# Patient Record
Sex: FEMALE | Employment: FULL TIME | URBAN - METROPOLITAN AREA
[De-identification: names, ages, dates, MRNs, and addresses within clinical notes are randomized per-mention and may not be internally consistent; named-entity substitution may affect disease eponyms.]

---

## 2023-11-27 ENCOUNTER — OCCMED (OUTPATIENT)
Dept: URGENT CARE | Facility: CLINIC | Age: 48
End: 2023-11-27

## 2023-11-27 ENCOUNTER — APPOINTMENT (OUTPATIENT)
Dept: LAB | Facility: CLINIC | Age: 48
End: 2023-11-27

## 2023-11-27 DIAGNOSIS — Z02.1 PHYSICAL EXAM, PRE-EMPLOYMENT: ICD-10-CM

## 2023-11-27 DIAGNOSIS — Z02.1 PHYSICAL EXAM, PRE-EMPLOYMENT: Primary | ICD-10-CM

## 2023-11-27 LAB
MEV IGG SER QL IA: ABNORMAL
MUV IGG SER QL IA: NORMAL
RUBV IGG SERPL IA-ACNC: 29.1 IU/ML
VZV IGG SER QL IA: NORMAL

## 2023-11-27 PROCEDURE — 86480 TB TEST CELL IMMUN MEASURE: CPT

## 2023-11-27 PROCEDURE — 86787 VARICELLA-ZOSTER ANTIBODY: CPT

## 2023-11-27 PROCEDURE — 86762 RUBELLA ANTIBODY: CPT

## 2023-11-27 PROCEDURE — 86735 MUMPS ANTIBODY: CPT

## 2023-11-27 PROCEDURE — 36415 COLL VENOUS BLD VENIPUNCTURE: CPT

## 2023-11-27 PROCEDURE — 86765 RUBEOLA ANTIBODY: CPT

## 2023-11-28 LAB
GAMMA INTERFERON BACKGROUND BLD IA-ACNC: 0.02 IU/ML
M TB IFN-G BLD-IMP: NEGATIVE
M TB IFN-G CD4+ BCKGRND COR BLD-ACNC: 0.14 IU/ML
M TB IFN-G CD4+ BCKGRND COR BLD-ACNC: 0.19 IU/ML
MITOGEN IGNF BCKGRD COR BLD-ACNC: 9.98 IU/ML

## 2024-03-27 ENCOUNTER — TELEPHONE (OUTPATIENT)
Dept: FAMILY MEDICINE CLINIC | Facility: CLINIC | Age: 49
End: 2024-03-27

## 2024-04-10 ENCOUNTER — APPOINTMENT (OUTPATIENT)
Dept: LAB | Facility: MEDICAL CENTER | Age: 49
End: 2024-04-10

## 2024-04-10 ENCOUNTER — OFFICE VISIT (OUTPATIENT)
Dept: FAMILY MEDICINE CLINIC | Facility: CLINIC | Age: 49
End: 2024-04-10
Payer: COMMERCIAL

## 2024-04-10 ENCOUNTER — APPOINTMENT (OUTPATIENT)
Dept: LAB | Facility: CLINIC | Age: 49
End: 2024-04-10
Payer: COMMERCIAL

## 2024-04-10 VITALS
WEIGHT: 206 LBS | HEIGHT: 62 IN | DIASTOLIC BLOOD PRESSURE: 80 MMHG | BODY MASS INDEX: 37.91 KG/M2 | RESPIRATION RATE: 16 BRPM | TEMPERATURE: 98 F | OXYGEN SATURATION: 100 % | SYSTOLIC BLOOD PRESSURE: 114 MMHG | HEART RATE: 76 BPM

## 2024-04-10 DIAGNOSIS — R23.2 HOT FLASHES: ICD-10-CM

## 2024-04-10 DIAGNOSIS — R55 NEAR SYNCOPE: ICD-10-CM

## 2024-04-10 DIAGNOSIS — E78.5 HYPERLIPIDEMIA, UNSPECIFIED HYPERLIPIDEMIA TYPE: ICD-10-CM

## 2024-04-10 DIAGNOSIS — R73.03 PREDIABETES: Primary | ICD-10-CM

## 2024-04-10 DIAGNOSIS — G47.9 SLEEP DISTURBANCE: ICD-10-CM

## 2024-04-10 DIAGNOSIS — I10 PRIMARY HYPERTENSION: ICD-10-CM

## 2024-04-10 DIAGNOSIS — R73.03 PREDIABETES: ICD-10-CM

## 2024-04-10 PROBLEM — N31.8 FREQUENCY-URGENCY SYNDROME: Status: ACTIVE | Noted: 2017-01-04

## 2024-04-10 PROBLEM — J45.909 ASTHMA: Status: ACTIVE | Noted: 2024-04-10

## 2024-04-10 PROBLEM — M25.50 JOINT PAIN: Status: ACTIVE | Noted: 2024-04-10

## 2024-04-10 LAB
ALBUMIN SERPL BCP-MCNC: 4.3 G/DL (ref 3.5–5)
ALP SERPL-CCNC: 85 U/L (ref 34–104)
ALT SERPL W P-5'-P-CCNC: 50 U/L (ref 7–52)
ANION GAP SERPL CALCULATED.3IONS-SCNC: 10 MMOL/L (ref 4–13)
AST SERPL W P-5'-P-CCNC: 53 U/L (ref 13–39)
BASOPHILS # BLD AUTO: 0.07 THOUSANDS/ÂΜL (ref 0–0.1)
BASOPHILS NFR BLD AUTO: 1 % (ref 0–1)
BILIRUB SERPL-MCNC: 0.42 MG/DL (ref 0.2–1)
BUN SERPL-MCNC: 9 MG/DL (ref 5–25)
CALCIUM SERPL-MCNC: 9 MG/DL (ref 8.4–10.2)
CHLORIDE SERPL-SCNC: 104 MMOL/L (ref 96–108)
CHOLEST SERPL-MCNC: 190 MG/DL
CO2 SERPL-SCNC: 25 MMOL/L (ref 21–32)
CREAT SERPL-MCNC: 0.74 MG/DL (ref 0.6–1.3)
EOSINOPHIL # BLD AUTO: 0.16 THOUSAND/ÂΜL (ref 0–0.61)
EOSINOPHIL NFR BLD AUTO: 2 % (ref 0–6)
ERYTHROCYTE [DISTWIDTH] IN BLOOD BY AUTOMATED COUNT: 11.9 % (ref 11.6–15.1)
EST. AVERAGE GLUCOSE BLD GHB EST-MCNC: 131 MG/DL
GFR SERPL CREATININE-BSD FRML MDRD: 94 ML/MIN/1.73SQ M
GLUCOSE P FAST SERPL-MCNC: 84 MG/DL (ref 65–99)
HBA1C MFR BLD: 6.2 %
HCT VFR BLD AUTO: 46.3 % (ref 34.8–46.1)
HDLC SERPL-MCNC: 54 MG/DL
HGB BLD-MCNC: 14.8 G/DL (ref 11.5–15.4)
IMM GRANULOCYTES # BLD AUTO: 0.03 THOUSAND/UL (ref 0–0.2)
IMM GRANULOCYTES NFR BLD AUTO: 0 % (ref 0–2)
LDLC SERPL CALC-MCNC: 117 MG/DL (ref 0–100)
LYMPHOCYTES # BLD AUTO: 3.35 THOUSANDS/ÂΜL (ref 0.6–4.47)
LYMPHOCYTES NFR BLD AUTO: 41 % (ref 14–44)
MCH RBC QN AUTO: 30.6 PG (ref 26.8–34.3)
MCHC RBC AUTO-ENTMCNC: 32 G/DL (ref 31.4–37.4)
MCV RBC AUTO: 96 FL (ref 82–98)
MONOCYTES # BLD AUTO: 0.68 THOUSAND/ÂΜL (ref 0.17–1.22)
MONOCYTES NFR BLD AUTO: 8 % (ref 4–12)
NEUTROPHILS # BLD AUTO: 3.97 THOUSANDS/ÂΜL (ref 1.85–7.62)
NEUTS SEG NFR BLD AUTO: 48 % (ref 43–75)
NONHDLC SERPL-MCNC: 136 MG/DL
NRBC BLD AUTO-RTO: 0 /100 WBCS
PLATELET # BLD AUTO: 226 THOUSANDS/UL (ref 149–390)
PMV BLD AUTO: 13.4 FL (ref 8.9–12.7)
POTASSIUM SERPL-SCNC: 4.9 MMOL/L (ref 3.5–5.3)
PROT SERPL-MCNC: 7.3 G/DL (ref 6.4–8.4)
RBC # BLD AUTO: 4.84 MILLION/UL (ref 3.81–5.12)
SODIUM SERPL-SCNC: 139 MMOL/L (ref 135–147)
TRIGL SERPL-MCNC: 97 MG/DL
WBC # BLD AUTO: 8.26 THOUSAND/UL (ref 4.31–10.16)

## 2024-04-10 PROCEDURE — 36415 COLL VENOUS BLD VENIPUNCTURE: CPT

## 2024-04-10 PROCEDURE — 99203 OFFICE O/P NEW LOW 30 MIN: CPT | Performed by: FAMILY MEDICINE

## 2024-04-10 PROCEDURE — 82672 ASSAY OF ESTROGEN: CPT

## 2024-04-10 PROCEDURE — 85025 COMPLETE CBC W/AUTO DIFF WBC: CPT

## 2024-04-10 PROCEDURE — 80053 COMPREHEN METABOLIC PANEL: CPT

## 2024-04-10 PROCEDURE — 80061 LIPID PANEL: CPT

## 2024-04-10 PROCEDURE — 83036 HEMOGLOBIN GLYCOSYLATED A1C: CPT

## 2024-04-10 RX ORDER — ALBUTEROL SULFATE 2.5 MG/3ML
2.5 SOLUTION RESPIRATORY (INHALATION) EVERY 4 HOURS PRN
COMMUNITY
Start: 2023-12-17 | End: 2024-12-16

## 2024-04-10 RX ORDER — IBUPROFEN 200 MG
200 TABLET ORAL
COMMUNITY

## 2024-04-10 RX ORDER — EVENING PRIMROSE OIL 500 MG
500 CAPSULE ORAL
COMMUNITY
End: 2024-04-10

## 2024-04-10 RX ORDER — ALBUTEROL SULFATE 90 UG/1
AEROSOL, METERED RESPIRATORY (INHALATION)
COMMUNITY

## 2024-04-10 NOTE — PROGRESS NOTES
Anh Hart 1975 female MRN: [id number]    [facility] PRACTICE OFFICE VISIT  [facility] - [facility]      ASSESSMENT/PLAN  Anh Hart is a 48 y.o. female presents to the office for    Diagnoses and all orders for this visit:    Prediabetes  -     Hemoglobin A1C; Future  -     Comprehensive metabolic panel; Future  -     CBC and differential; Future    Sleep disturbance    Primary hypertension    Hot flashes  -     CBC and differential; Future  -     Estrogens, total; Future    Near syncope    Hyperlipidemia, unspecified hyperlipidemia type  -     Lipid panel; Future    Other orders  -     Multiple Vitamin (MULTIVITAMIN ADULT PO); Take 1 tablet by mouth daily  -     albuterol (2.5 mg/3 mL) 0.083 % nebulizer solution; Inhale 2.5 mg every 4 (four) hours as needed  -     albuterol (PROVENTIL HFA,VENTOLIN HFA) 90 mcg/act inhaler; Inhale  -     Discontinue: Evening Primrose Oil 500 MG CAPS; Take 500 mg by mouth (Patient not taking: Reported on 4/10/2024)  -     ibuprofen (MOTRIN) 200 mg tablet; Take 200 mg by mouth       Likely was secondary to intermittent fasting did advise the patient that I recommend that she has scheduled meals in order to avoid hypoglycemic event.  Today her blood sugar and vitals are all within normal range.  Will send for blood work for evaluation           Future Appointments   Date Time Provider Department Center   5/31/2024  1:00 PM Jojo Gu MD [facility]   6/5/2024  4:30 PM Jojo Gu MD [facility]          SUBJECTIVE  CC: Shaking (Blanked out, felt a hot flash, felt better after eating something )      HPI:  Anh Hart is a 48 y.o. female who presents for an acute appointment.  Was on a phone call when she started sweating, hot flashes, shaking and felt like her sugar was too low.   Ozempic was making her have blurry vision therefore had discontinued that in the past.   States that she does do intermittent fasting for Church and health purposes    Review of Systems   Constitutional:  Negative for activity change, appetite change, chills, fatigue and fever.   HENT:  Negative for congestion.    Respiratory:  Negative for cough, chest tightness and shortness of breath.    Cardiovascular:  Negative for chest pain and leg swelling.   Gastrointestinal:  Negative for abdominal distention, abdominal pain, constipation, diarrhea, nausea and vomiting.   All other systems reviewed and are negative.      Historical Information   The patient history was reviewed as follows:  Past Medical History:   Diagnosis Date   • Allergic    • Asthma    • Eating disorder    • Obesity    • RhD negative          Medications:     Current Outpatient Medications:   •  albuterol (2.5 mg/3 mL) 0.083 % nebulizer solution, Inhale 2.5 mg every 4 (four) hours as needed, Disp: , Rfl:   •  albuterol (PROVENTIL HFA,VENTOLIN HFA) 90 mcg/act inhaler, Inhale, Disp: , Rfl:   •  ibuprofen (MOTRIN) 200 mg tablet, Take 200 mg by mouth, Disp: , Rfl:   •  Multiple Vitamin (MULTIVITAMIN ADULT PO), Take 1 tablet by mouth daily, Disp: , Rfl:     Allergies   Allergen Reactions   • Azithromycin Anaphylaxis   • Penicillins Hives     Hives, vomiting, itchy throat   • Shellfish Allergy - Food Allergy Hives, Shortness Of Breath and Throat Swelling     Hives, anaphylaxis   • Doxycycline Nausea Only   • Influenza Vaccines Other (See Comments)     States of convulsions   • Iodinated Contrast Media Hives and Rash     Pt. Had ct a/p w/contrast performed on 4/23/14. Pt. Called on 4/26/14 stating that she had rash/hives three hours after ct scan once she returned home from the emergency department. S/w dr. Hull concerning reaction. Update in epic that pt. Is allergic to ct dye and should be premedicated in the future for ct scans due to possibility of reaction.       OBJECTIVE  Vitals:   Vitals:    04/10/24 0943   BP: 114/80   BP Location:  "Right arm   Patient Position: Sitting   Cuff Size: Large   Pulse: 76   Resp: 16   Temp: 98 °F (36.7 °C)   SpO2: 100%   Weight: 93.4 kg (206 lb)   Height: 5' 2\" (1.575 m)         Physical Exam  Vitals reviewed.   Constitutional:       Appearance: She is well-developed.   HENT:      Head: Normocephalic and atraumatic.   Eyes:      Conjunctiva/sclera: Conjunctivae normal.      Pupils: Pupils are equal, round, and reactive to light.   Cardiovascular:      Rate and Rhythm: Normal rate and regular rhythm.      Heart sounds: Normal heart sounds.   Pulmonary:      Effort: Pulmonary effort is normal. No respiratory distress.      Breath sounds: Normal breath sounds.   Musculoskeletal:         General: Normal range of motion.      Cervical back: Normal range of motion and neck supple.   Skin:     General: Skin is warm.      Capillary Refill: Capillary refill takes less than 2 seconds.   Neurological:      Mental Status: She is alert and oriented to person, place, and time.                    Jojo Bess MD,   [facility]  4/10/2024      "

## 2024-04-14 LAB — ESTROGEN SERPL-MCNC: 66 PG/ML

## 2024-04-20 ENCOUNTER — OFFICE VISIT (OUTPATIENT)
Dept: URGENT CARE | Facility: MEDICAL CENTER | Age: 49
End: 2024-04-20
Payer: COMMERCIAL

## 2024-04-20 VITALS
WEIGHT: 204.4 LBS | TEMPERATURE: 97.3 F | HEART RATE: 84 BPM | SYSTOLIC BLOOD PRESSURE: 144 MMHG | RESPIRATION RATE: 20 BRPM | BODY MASS INDEX: 37.39 KG/M2 | OXYGEN SATURATION: 99 % | DIASTOLIC BLOOD PRESSURE: 87 MMHG

## 2024-04-20 DIAGNOSIS — J06.9 UPPER RESPIRATORY TRACT INFECTION, UNSPECIFIED TYPE: Primary | ICD-10-CM

## 2024-04-20 PROCEDURE — G0383 LEV 4 HOSP TYPE B ED VISIT: HCPCS | Performed by: PHYSICIAN ASSISTANT

## 2024-04-20 NOTE — PATIENT INSTRUCTIONS
Increase fluids  Tylenol as needed for headache/body aches  Nasal saline as needed for congestion  Follow-up with PCP if symptoms persist or worsen.

## 2024-04-20 NOTE — LETTER
April 20, 2024     Patient: Anh Hart   YOB: 1975   Date of Visit: 4/20/2024       To Whom It May Concern:    It is my medical opinion that Anh Hart may return to work on 4/22/24 .    If you have any questions or concerns, please don't hesitate to call.         Sincerely,        Glen Wiggins PA-C    CC: No Recipients

## 2024-04-20 NOTE — PROGRESS NOTES
[facility]        NAME: Anh Hart is a 48 y.o. female  : 1975    MRN: [id number]  DATE: 2024  TIME: 1:20 PM    Assessment and Plan   Upper respiratory tract infection, unspecified type [J06.9]  1. Upper respiratory tract infection, unspecified type              Patient Instructions     Increase fluids  Tylenol as needed for headache/body aches  Nasal saline as needed for congestion  Follow-up with PCP if symptoms persist or worsen.       If tests have been performed at [facility], our office will contact you with results if changes need to be made to the care plan discussed with you at the visit.  You can review your full results on [facility] MyChart.    Chief Complaint     Chief Complaint   Patient presents with    Cold Like Symptoms     Pt c/o cold symptoms. Fever chills Sinus congestion. C/o headache and pressure mild cough  Abdominal pain          History of Present Illness       Anh is a 48-year-old female who presents with 3-day history of generalized malaise, chills, body aches nasal discharge and slight cough.  She reports no vomiting or diarrhea since the onset of illness.  Patient reports her's  has similar symptoms.        Review of Systems   Review of Systems   Constitutional:  Positive for chills and fatigue.   HENT:  Positive for congestion, postnasal drip and rhinorrhea.    Respiratory:  Positive for cough.    Gastrointestinal: Negative.          Current Medications       Current Outpatient Medications:     albuterol (2.5 mg/3 mL) 0.083 % nebulizer solution, Inhale 2.5 mg every 4 (four) hours as needed, Disp: , Rfl:     albuterol (PROVENTIL HFA,VENTOLIN HFA) 90 mcg/act inhaler, Inhale, Disp: , Rfl:     ibuprofen (MOTRIN) 200 mg tablet, Take 200 mg by mouth, Disp: , Rfl:     Multiple Vitamin (MULTIVITAMIN ADULT PO), Take 1 tablet by mouth daily, Disp: , Rfl:     Current Allergies     Allergies as of 2024 - Reviewed 2024   Allergen Reaction  Noted    Azithromycin Anaphylaxis 12/08/2022    Penicillins Hives 07/19/2013    Shellfish allergy - food allergy Hives, Shortness Of Breath, and Throat Swelling 07/19/2013    Doxycycline Nausea Only 04/03/2014    Influenza vaccines Other (See Comments) 09/22/2014    Iodinated contrast media Hives and Rash 04/26/2014            The following portions of the patient's history were reviewed and updated as appropriate: allergies, current medications, past family history, past medical history, past social history, past surgical history and problem list.     Past Medical History:   Diagnosis Date    Allergic     Asthma     Eating disorder     Obesity     RhD negative        Past Surgical History:   Procedure Laterality Date    MYOMECTOMY      cryo, laser and D&C       Family History   Problem Relation Age of Onset    Asthma Mother     Diabetes Maternal Uncle          Medications have been verified.        Objective   /87   Pulse 84   Temp (!) 97.3 °F (36.3 °C)   Resp 20   Wt 92.7 kg (204 lb 6.4 oz)   SpO2 99%   BMI 37.39 kg/m²   No LMP recorded.       Physical Exam     Physical Exam  Constitutional:       General: She is not in acute distress.     Appearance: Normal appearance. She is not ill-appearing.   HENT:      Head: Normocephalic and atraumatic.      Right Ear: Tympanic membrane and ear canal normal.      Left Ear: Tympanic membrane and ear canal normal.      Nose: Mucosal edema, congestion and rhinorrhea present. Rhinorrhea is clear.      Mouth/Throat:      Lips: Pink.      Pharynx: Oropharynx is clear.   Cardiovascular:      Rate and Rhythm: Normal rate and regular rhythm.      Heart sounds: Normal heart sounds, S1 normal and S2 normal. No murmur heard.  Pulmonary:      Effort: Pulmonary effort is normal.      Breath sounds: Normal breath sounds and air entry.   Neurological:      Mental Status: She is alert.

## 2024-05-23 ENCOUNTER — TELEPHONE (OUTPATIENT)
Dept: FAMILY MEDICINE CLINIC | Facility: CLINIC | Age: 49
End: 2024-05-23

## 2024-05-23 ENCOUNTER — TELEPHONE (OUTPATIENT)
Age: 49
End: 2024-05-23

## 2024-05-23 NOTE — TELEPHONE ENCOUNTER
Patient called and is now scheduled for 5/29.  Could only squeeze into a 15 min slot.  If there is a problem with her work schedule she will call back to reschedule.

## 2024-05-23 NOTE — TELEPHONE ENCOUNTER
LMOM for patient to call me back.  Trying to get her scheduled for next Wednesday. Advised patient if swelling continues Dr. Bess asked that she go to the ED.

## 2024-05-29 ENCOUNTER — OFFICE VISIT (OUTPATIENT)
Dept: FAMILY MEDICINE CLINIC | Facility: CLINIC | Age: 49
End: 2024-05-29
Payer: COMMERCIAL

## 2024-05-29 VITALS
HEART RATE: 74 BPM | HEIGHT: 62 IN | TEMPERATURE: 97.2 F | DIASTOLIC BLOOD PRESSURE: 80 MMHG | WEIGHT: 206 LBS | BODY MASS INDEX: 37.91 KG/M2 | RESPIRATION RATE: 18 BRPM | SYSTOLIC BLOOD PRESSURE: 112 MMHG

## 2024-05-29 DIAGNOSIS — R73.03 PREDIABETES: Primary | ICD-10-CM

## 2024-05-29 DIAGNOSIS — E16.2 HYPOGLYCEMIA: ICD-10-CM

## 2024-05-29 PROCEDURE — 99213 OFFICE O/P EST LOW 20 MIN: CPT | Performed by: FAMILY MEDICINE

## 2024-05-29 RX ORDER — LANCETS 33 GAUGE
EACH MISCELLANEOUS
Qty: 200 EACH | Refills: 3 | Status: SHIPPED | OUTPATIENT
Start: 2024-05-29

## 2024-05-29 RX ORDER — BLOOD SUGAR DIAGNOSTIC
STRIP MISCELLANEOUS
Qty: 200 EACH | Refills: 3 | Status: SHIPPED | OUTPATIENT
Start: 2024-05-29

## 2024-05-29 RX ORDER — BLOOD-GLUCOSE METER
KIT MISCELLANEOUS
Qty: 1 KIT | Refills: 0 | Status: SHIPPED | OUTPATIENT
Start: 2024-05-29

## 2024-05-29 NOTE — LETTER
May 29, 2024     Patient: Anh Hart  YOB: 1975  Date of Visit: 5/29/2024      To Whom it May Concern:    Anh Hart is under my professional care. Anh was seen in my office on 5/29/2024. Anh will need to take lunch at 12:00- 12:30, to avoid hypoglycemic events. Will need it scheduled at the same time daily, till seen by specialist and avoid worsening symptoms     If you have any questions or concerns, please don't hesitate to call.         Sincerely,          Jojo Bess MD        CC: No Recipients

## 2024-05-29 NOTE — PROGRESS NOTES
Anh Hart 1975 female MRN: [id number]    FAMILY PRACTICE OFFICE VISIT  [facility] - [facility]      ASSESSMENT/PLAN  Anh Hart is a 48 y.o. female presents to the office for    Diagnoses and all orders for this visit:    Prediabetes  -     Blood Glucose Monitoring Suppl (OneTouch Verio Reflect) w/Device KIT; Check blood sugars twice daily. Please substitute with appropriate alternative as covered by patient's insurance. Dx: E11.65  -     glucose blood (OneTouch Verio) test strip; Check blood sugars twice daily. Please substitute with appropriate alternative as covered by patient's insurance. Dx: E11.65  -     OneTouch Delica Lancets 33G MISC; Check blood sugars twice daily. Please substitute with appropriate alternative as covered by patient's insurance. Dx: E11.65  -     Ambulatory Referral to Endocrinology; Future    Hypoglycemia  -     Blood Glucose Monitoring Suppl (OneTouch Verio Reflect) w/Device KIT; Check blood sugars twice daily. Please substitute with appropriate alternative as covered by patient's insurance. Dx: E11.65  -     glucose blood (OneTouch Verio) test strip; Check blood sugars twice daily. Please substitute with appropriate alternative as covered by patient's insurance. Dx: E11.65  -     OneTouch Delica Lancets 33G MISC; Check blood sugars twice daily. Please substitute with appropriate alternative as covered by patient's insurance. Dx: E11.65  -     Ambulatory Referral to Endocrinology; Future    At this time would like the patient to monitor her blood sugars to be sure that we are not missed diagnosing her.  Patient is to write down a log into submitted to us that were able to evaluate her better.  Will refer her to endocrinology.           Future Appointments   Date Time Provider Department Center   8/30/2024  9:20 AM Emma Lennon MD [facility]   10/2/2024  4:15 PM Jojo Gu MD [facility]  [facility]          SUBJECTIVE  CC: weakness  (States she feels if she doesn't eat at the certain time she feels that her whole body hurts, ankle swelling //HK CMA )      HPI:  Anh Hart is a 48 y.o. female who presents for an acute appointment.  Patient unfortunately still feels lightheaded and dizzy states that she has had unable to function if she does not eat.  Patient states that she cannot be on such an unorganized schedule and needs daily schedule that stays the same for her to not have any attacks.  The attacks consist of chest tightness dizziness lightheadedness and numbness  Review of Systems   Constitutional:  Negative for activity change, appetite change, chills, fatigue and fever.   HENT:  Negative for congestion.    Respiratory:  Positive for chest tightness. Negative for cough and shortness of breath.    Cardiovascular:  Negative for chest pain and leg swelling.   Gastrointestinal:  Negative for abdominal distention, abdominal pain, constipation, diarrhea, nausea and vomiting.   Neurological:  Positive for dizziness, light-headedness and numbness.   All other systems reviewed and are negative.      Historical Information   The patient history was reviewed as follows:  Past Medical History:   Diagnosis Date   • Allergic    • Asthma    • Eating disorder    • Obesity    • RhD negative          Medications:     Current Outpatient Medications:   •  albuterol (2.5 mg/3 mL) 0.083 % nebulizer solution, Inhale 2.5 mg every 4 (four) hours as needed, Disp: , Rfl:   •  albuterol (PROVENTIL HFA,VENTOLIN HFA) 90 mcg/act inhaler, Inhale if needed, Disp: , Rfl:   •  Blood Glucose Monitoring Suppl (OneTouch Verio Reflect) w/Device KIT, Check blood sugars twice daily. Please substitute with appropriate alternative as covered by patient's insurance. Dx: E11.65, Disp: 1 kit, Rfl: 0  •  glucose blood (OneTouch Verio) test strip, Check blood sugars twice daily. Please substitute with appropriate alternative  "as covered by patient's insurance. Dx: E11.65, Disp: 200 each, Rfl: 3  •  ibuprofen (MOTRIN) 200 mg tablet, Take 200 mg by mouth if needed, Disp: , Rfl:   •  OneTouch Delica Lancets 33G MISC, Check blood sugars twice daily. Please substitute with appropriate alternative as covered by patient's insurance. Dx: E11.65, Disp: 200 each, Rfl: 3  •  Multiple Vitamin (MULTIVITAMIN ADULT PO), Take 1 tablet by mouth daily (Patient not taking: Reported on 5/29/2024), Disp: , Rfl:     Allergies   Allergen Reactions   • Azithromycin Anaphylaxis   • Penicillins Hives     Hives, vomiting, itchy throat   • Shellfish Allergy - Food Allergy Hives, Shortness Of Breath and Throat Swelling     Hives, anaphylaxis   • Doxycycline Nausea Only   • Influenza Vaccines Other (See Comments)     States of convulsions   • Iodinated Contrast Media Hives and Rash     Pt. Had ct a/p w/contrast performed on 4/23/14. Pt. Called on 4/26/14 stating that she had rash/hives three hours after ct scan once she returned home from the emergency department. S/w dr. Hull concerning reaction. Update in [facility] that pt. Is allergic to ct dye and should be premedicated in the future for ct scans due to possibility of reaction.       OBJECTIVE  Vitals:   Vitals:    05/29/24 1435   BP: 112/80   Pulse: 74   Resp: 18   Temp: (!) 97.2 °F (36.2 °C)   Weight: 93.4 kg (206 lb)   Height: 5' 2\" (1.575 m)         Physical Exam  Vitals reviewed.   Constitutional:       Appearance: She is well-developed.   HENT:      Head: Normocephalic and atraumatic.   Eyes:      Extraocular Movements: EOM normal.      Conjunctiva/sclera: Conjunctivae normal.      Pupils: Pupils are equal, round, and reactive to light.   Cardiovascular:      Rate and Rhythm: Normal rate and regular rhythm.      Heart sounds: Normal heart sounds, S1 normal and S2 normal. No murmur heard.  Pulmonary:      Effort: Pulmonary effort is normal. No respiratory distress.      Breath sounds: Normal breath sounds. No " wheezing.   Musculoskeletal:         General: No edema. Normal range of motion.      Cervical back: Normal range of motion and neck supple.   Skin:     General: Skin is warm.   Neurological:      Mental Status: She is alert and oriented to person, place, and time.   Psychiatric:         Mood and Affect: Mood and affect normal.         Speech: Speech normal.         Behavior: Behavior normal.         Thought Content: Thought content normal.         Judgment: Judgment normal.                    Jojo Bess MD,   [facility]  6/1/2024

## 2024-05-29 NOTE — PATIENT INSTRUCTIONS
Testing frequency: Encouraged blood sugar testing. Test sugars before a meal and 2hr after the same meal, rotating between breakfast, lunch, and dinner. Test sugars twice a day (3 days a week, 7 days a week).      Goal Blood Sugars:   Premeal , even better <110  2hr after a meal <180, even better <140  A1C <7%, even better <6.5%.

## 2024-06-25 ENCOUNTER — TELEPHONE (OUTPATIENT)
Dept: PSYCHIATRY | Facility: CLINIC | Age: 49
End: 2024-06-25

## 2024-06-25 NOTE — TELEPHONE ENCOUNTER
The patient contacted the office, seeking services. The writer notified the patient of the current wait list. The patient refused to be placed on the wait list.

## 2024-08-02 ENCOUNTER — OFFICE VISIT (OUTPATIENT)
Dept: FAMILY MEDICINE CLINIC | Facility: CLINIC | Age: 49
End: 2024-08-02
Payer: COMMERCIAL

## 2024-08-02 VITALS
DIASTOLIC BLOOD PRESSURE: 78 MMHG | SYSTOLIC BLOOD PRESSURE: 110 MMHG | TEMPERATURE: 96.8 F | WEIGHT: 206.4 LBS | HEART RATE: 78 BPM | BODY MASS INDEX: 37.75 KG/M2 | RESPIRATION RATE: 18 BRPM

## 2024-08-02 DIAGNOSIS — M54.31 RIGHT SIDED SCIATICA: Primary | ICD-10-CM

## 2024-08-02 PROCEDURE — 99214 OFFICE O/P EST MOD 30 MIN: CPT | Performed by: NURSE PRACTITIONER

## 2024-08-02 RX ORDER — PREDNISONE 20 MG/1
20 TABLET ORAL 2 TIMES DAILY WITH MEALS
Qty: 14 TABLET | Refills: 0 | Status: SHIPPED | OUTPATIENT
Start: 2024-08-02 | End: 2024-08-09

## 2024-08-02 RX ORDER — CYCLOBENZAPRINE HCL 10 MG
10 TABLET ORAL 2 TIMES DAILY PRN
Qty: 20 TABLET | Refills: 0 | Status: SHIPPED | OUTPATIENT
Start: 2024-08-02

## 2024-08-02 NOTE — LETTER
August 2, 2024     Patient: Anh Hart  YOB: 1975  Date of Visit: 8/2/2024      To Whom it May Concern:    Anh Hart is under my professional care. Anh was seen in my office on 8/2/2024. Anh may return to work on 8/5/2024 .    If you have any questions or concerns, please don't hesitate to call.         Sincerely,          IBAN Khalil

## 2024-08-02 NOTE — PROGRESS NOTES
Ambulatory Visit  Name: Anh Hart      : 1975      MRN: [id number]  Encounter Provider: IBAN Khalil  Encounter Date: 2024   Encounter department: [facility]    Assessment & Plan   1. Right sided sciatica  Alternate ice and heat- 20 minutes on, 20 minutes off.   Prednisone 20gm twice daily with food for 7 days   Flexeril 10mg twice daily as needed. Do not drive or operate machinery while taking this medication.   Gentle stretching exercises.   If symptoms persist or worsen, consider physical therapy as discussed.   - predniSONE 20 mg tablet; Take 1 tablet (20 mg total) by mouth 2 (two) times a day with meals for 7 days  Dispense: 14 tablet; Refill: 0  - cyclobenzaprine (FLEXERIL) 10 mg tablet; Take 1 tablet (10 mg total) by mouth 2 (two) times a day as needed for muscle spasms  Dispense: 20 tablet; Refill: 0       History of Present Illness     Here for left leg pain. Shooting down from  hip to entire leg. Sharp. Had 2 episodes of numbness and tingling.   Symptoms for 3 days.   No known injury  Took tylenol and ibuprofen.   Also reports intermittent joint pain but states has never discussed those issues with her pcp. Does not have those symptoms at this time but will make appt to discuss with Dr. Bess going forward         Review of Systems   Constitutional:  Negative for fever and unexpected weight change.   Cardiovascular:  Negative for leg swelling.   Musculoskeletal:  Positive for arthralgias and myalgias.   Skin:  Negative for rash and wound.   Neurological:  Positive for numbness.       Objective     /78   Pulse 78   Temp (!) 96.8 °F (36 °C)   Resp 18   Wt 93.6 kg (206 lb 6.4 oz)   LMP  (LMP Unknown)   BMI 37.75 kg/m²     Physical Exam  Vitals reviewed.   Constitutional:       General: She is in acute distress (appear visibly uncomfortable).      Appearance: She is not ill-appearing.   Cardiovascular:      Rate and Rhythm: Normal rate.    Pulmonary:      Effort: Pulmonary effort is normal.   Musculoskeletal:         General: Normal range of motion.      Comments: Reproducible pain radicular pain with deep palpation over right SI notch  Negative SLR  No point vertebral tenderness   Skin:     General: Skin is warm and dry.      Findings: No lesion or rash.   Neurological:      General: No focal deficit present.      Mental Status: She is alert and oriented to person, place, and time.      Cranial Nerves: No cranial nerve deficit.      Sensory: No sensory deficit.      Gait: Gait normal.   Psychiatric:         Mood and Affect: Mood normal.         Behavior: Behavior normal.         Thought Content: Thought content normal.         Judgment: Judgment normal.       Administrative Statements

## 2024-08-02 NOTE — PATIENT INSTRUCTIONS
Alternate ice and heat- 20 minutes on, 20 minutes off.   Prednisone 20gm twice daily with food for 7 days   Flexeril 10mg twice daily as needed. Do not drive or operate machinery while taking this medication.   Gentle stretching exercises.   If symptoms persist or worsen, consider physical therapy as discussed.

## 2024-08-05 ENCOUNTER — TELEPHONE (OUTPATIENT)
Dept: FAMILY MEDICINE CLINIC | Facility: CLINIC | Age: 49
End: 2024-08-05

## 2024-08-05 NOTE — TELEPHONE ENCOUNTER
Patient would like to see Dr. Bess but work hours permit her from being seen in the [place] office. Patient would like to be seen on  late night  Please schedule for patient

## 2024-08-06 ENCOUNTER — TELEPHONE (OUTPATIENT)
Dept: FAMILY MEDICINE CLINIC | Facility: CLINIC | Age: 49
End: 2024-08-06

## 2024-08-06 NOTE — TELEPHONE ENCOUNTER
Called patient. She says she is much better- no more sciatic pain going down her leg. No need for appointment.

## 2024-08-06 NOTE — TELEPHONE ENCOUNTER
Jojo Gu MD   to [facility] Clerical       8/5/24 10:03 PM  Please schedule for Thursday           8/5/24  5:21 PM  Nelda Kaplan MA routed this conversation to [facility]    8/5/24  4:35 PM  Dana Gaming MA routed this conversation to Jojo Gu MD  [facility]  [place]   to [facility] Clinical   PH    8/5/24  4:34 PM  IAN Hart   to [facility] Clinical (supporting Jojo Gu MD)         8/5/24  4:30 PM  Thank you! Your amazing. They only have late appointments in [facility] and they said I could only be seen in the [place]. Dana tried to schedule me and couldn't. She said she will have Alma call me.  Jojo Gu MD   to [facility] Clinical  [facility] Clerical       8/5/24  4:09 PM  I have plenty of openings not sure why Mohini was scheduled

## 2024-08-06 NOTE — TELEPHONE ENCOUNTER
Jojo Gu MD   to [facility] Clerical       8/5/24 10:03 PM  Please schedule for Thursday           8/5/24  5:21 PM  Nelda Kaplan MA routed this conversation to [facility]    8/5/24  4:35 PM  Dana Gaming MA routed this conversation to Jojo Gu MD  [facility]  [place]   to [facility] Clinical   PH    8/5/24  4:34 PM  IAN Hart   to [facility] Clinical (supporting Jojo Gu MD)         8/5/24  4:30 PM  Thank you! Your amazing. They only have late appointments in [facility] and they said I could only be seen in the [place]. Dana tried to schedule me and couldn't. She said she will have [place] call me.  Jojo Gu MD   to [facility] Clinical  [facility] Clerical       8/5/24  4:09 PM  I have plenty of openings not sure why Mohini was scheduled

## 2024-08-06 NOTE — TELEPHONE ENCOUNTER
I have plenty of openings not sure why Mohini was scheduled  Jojo Gu MD   to Anh Hart         8/5/24  4:09 PM  I am so sorry they have told you this I have plenty of appointments available on Wednesday. I am not in the office today. I will have my staff reschedule you.        Last read by Anh Hart at  4:28 PM on 8/5/2024.           8/5/24  1:43 PM  Dana Gaming MA routed this conversation to Jojo Gu MD           8/5/24  1:42 PM  Deepali Daniels routed this conversation to [facility]  Anh Hart   to  [facility] (supporting Jojo Gu MD)         8/5/24 11:42 AM  Hi Dr. Uribe it's so hard to get an appointment with you so I had to see Mohini on Friday. This pain is hurting and the prednisone and flexeril are  not helping me. She said I have to follow up with you but i work from 815 t0 445 pm and I can't keep taking off. I do have August 16th off which is my bday. But it really hurts to sit for hours. Is there something else you can recommend please and thank you.  It's for sciatica pain.

## 2024-08-22 DIAGNOSIS — M25.569 ARTHRALGIA OF LOWER LEG, UNSPECIFIED LATERALITY: Primary | ICD-10-CM

## 2024-08-24 ENCOUNTER — APPOINTMENT (OUTPATIENT)
Dept: LAB | Facility: MEDICAL CENTER | Age: 49
End: 2024-08-24
Payer: COMMERCIAL

## 2024-08-24 DIAGNOSIS — M25.569 ARTHRALGIA OF LOWER LEG, UNSPECIFIED LATERALITY: ICD-10-CM

## 2024-08-24 LAB
25(OH)D3 SERPL-MCNC: 12.7 NG/ML (ref 30–100)
VIT B12 SERPL-MCNC: 404 PG/ML (ref 180–914)

## 2024-08-24 PROCEDURE — 82306 VITAMIN D 25 HYDROXY: CPT

## 2024-08-24 PROCEDURE — 86618 LYME DISEASE ANTIBODY: CPT

## 2024-08-24 PROCEDURE — 36415 COLL VENOUS BLD VENIPUNCTURE: CPT

## 2024-08-24 PROCEDURE — 82607 VITAMIN B-12: CPT

## 2024-08-25 DIAGNOSIS — E55.9 VITAMIN D DEFICIENCY: Primary | ICD-10-CM

## 2024-08-25 LAB — B BURGDOR IGG+IGM SER QL IA: NEGATIVE

## 2024-08-25 RX ORDER — ERGOCALCIFEROL 1.25 MG/1
50000 CAPSULE, LIQUID FILLED ORAL WEEKLY
Qty: 8 CAPSULE | Refills: 0 | Status: SHIPPED | OUTPATIENT
Start: 2024-08-25 | End: 2024-10-14

## 2024-08-29 ENCOUNTER — TELEPHONE (OUTPATIENT)
Dept: FAMILY MEDICINE CLINIC | Facility: CLINIC | Age: 49
End: 2024-08-29

## 2024-08-29 NOTE — TELEPHONE ENCOUNTER
Patient dropped of Blood pressure readings for review which I placed in Dr Bess folder. Patient has PE appointment 9/5.

## 2024-09-05 ENCOUNTER — OFFICE VISIT (OUTPATIENT)
Dept: FAMILY MEDICINE CLINIC | Facility: CLINIC | Age: 49
End: 2024-09-05
Payer: COMMERCIAL

## 2024-09-05 VITALS
RESPIRATION RATE: 18 BRPM | BODY MASS INDEX: 38.64 KG/M2 | WEIGHT: 210 LBS | HEIGHT: 62 IN | OXYGEN SATURATION: 100 % | TEMPERATURE: 97.7 F | DIASTOLIC BLOOD PRESSURE: 90 MMHG | HEART RATE: 89 BPM | SYSTOLIC BLOOD PRESSURE: 136 MMHG

## 2024-09-05 DIAGNOSIS — Z00.00 PHYSICAL EXAM: Primary | ICD-10-CM

## 2024-09-05 DIAGNOSIS — E55.9 VITAMIN D DEFICIENCY: ICD-10-CM

## 2024-09-05 DIAGNOSIS — R35.0 URINARY FREQUENCY: ICD-10-CM

## 2024-09-05 DIAGNOSIS — R73.03 PRE-DIABETES: ICD-10-CM

## 2024-09-05 PROCEDURE — 99396 PREV VISIT EST AGE 40-64: CPT | Performed by: FAMILY MEDICINE

## 2024-09-05 RX ORDER — OXYBUTYNIN CHLORIDE 5 MG/1
5 TABLET ORAL
Qty: 30 TABLET | Refills: 0 | Status: SHIPPED | OUTPATIENT
Start: 2024-09-05

## 2024-09-05 RX ORDER — TRIAMCINOLONE ACETONIDE 5 MG/G
1 CREAM TOPICAL 3 TIMES DAILY
COMMUNITY

## 2024-09-05 RX ORDER — VITAMIN B COMPLEX
1 CAPSULE ORAL DAILY
COMMUNITY

## 2024-09-05 NOTE — PROGRESS NOTES
Adult Annual Physical  Name: Anh Hart      : 1975      MRN: [id number]  Encounter Provider: Jojo Bess MD  Encounter Date: 2024   Encounter department: [facility]    Assessment & Plan   1. Physical exam  2. Urinary frequency  -     oxybutynin (DITROPAN) 5 mg tablet; Take 1 tablet (5 mg total) by mouth daily at bedtime  3. Vitamin D deficiency  -     Vitamin D 25 hydroxy; Future  4. Pre-diabetes  -     Hemoglobin A1C; Future  -     Insulin, fasting; Future  -     Comprehensive metabolic panel; Future         History of Present Illness     Adult Annual Physical:  Patient presents for annual physical. 49-year-old female presenting for physical exam  States that she still has to have scheduled meals because she does not feel good if she misses anything.  She feels that it has everything to do with her sugars.  Patient is not taking vitamin supplements.  Patient states that she is complaining about urinary frequency has been having this for years.  .     Diet and Physical Activity:  - Diet/Nutrition: poor diet.  - Exercise: no formal exercise. body is in pain can't do it.    Depression Screening:  - PHQ-2 Score: 2    General Health:    - Hearing: normal hearing right ear and normal hearing left ear.  - Vision:. Little blurry    Review of Systems   Constitutional:  Positive for fatigue. Negative for activity change, appetite change, chills and fever.   HENT:  Negative for congestion.    Respiratory:  Negative for cough, chest tightness and shortness of breath.    Cardiovascular:  Negative for chest pain and leg swelling.   Gastrointestinal:  Negative for abdominal distention, abdominal pain, constipation, diarrhea, nausea and vomiting.   Genitourinary:  Positive for frequency.   Musculoskeletal:  Positive for arthralgias.   All other systems reviewed and are negative.        Objective     /90 (BP Location: Left arm, Patient Position: Sitting, Cuff Size: Large)    "Pulse 89   Temp 97.7 °F (36.5 °C) (Temporal)   Resp 18   Ht 5' 2\" (1.575 m)   Wt 95.3 kg (210 lb)   SpO2 100%   BMI 38.41 kg/m²     Physical Exam  Vitals reviewed.   Constitutional:       Appearance: Normal appearance. She is well-developed.   HENT:      Head: Normocephalic and atraumatic.      Right Ear: Tympanic membrane, ear canal and external ear normal. There is no impacted cerumen.      Left Ear: Tympanic membrane, ear canal and external ear normal. There is no impacted cerumen.      Nose: Nose normal.      Mouth/Throat:      Mouth: Mucous membranes are moist.      Pharynx: Oropharynx is clear.   Eyes:      Conjunctiva/sclera: Conjunctivae normal.      Pupils: Pupils are equal, round, and reactive to light.   Cardiovascular:      Rate and Rhythm: Normal rate and regular rhythm.      Heart sounds: Normal heart sounds.   Pulmonary:      Effort: Pulmonary effort is normal.      Breath sounds: Normal breath sounds.   Abdominal:      General: Abdomen is flat. Bowel sounds are normal.      Palpations: Abdomen is soft.   Musculoskeletal:         General: Normal range of motion.      Cervical back: Normal range of motion and neck supple.   Skin:     General: Skin is warm.      Capillary Refill: Capillary refill takes less than 2 seconds.   Neurological:      General: No focal deficit present.      Mental Status: She is alert and oriented to person, place, and time. Mental status is at baseline.   Psychiatric:         Mood and Affect: Mood normal.         Behavior: Behavior normal.         Thought Content: Thought content normal.         Judgment: Judgment normal.         "

## 2024-09-27 ENCOUNTER — HOSPITAL ENCOUNTER (EMERGENCY)
Facility: HOSPITAL | Age: 49
Discharge: HOME | End: 2024-09-27
Attending: EMERGENCY MEDICINE
Payer: COMMERCIAL

## 2024-09-27 ENCOUNTER — APPOINTMENT (EMERGENCY)
Dept: RADIOLOGY | Facility: HOSPITAL | Age: 49
End: 2024-09-27
Payer: COMMERCIAL

## 2024-09-27 VITALS
DIASTOLIC BLOOD PRESSURE: 88 MMHG | HEIGHT: 62 IN | HEART RATE: 65 BPM | WEIGHT: 206.57 LBS | OXYGEN SATURATION: 99 % | SYSTOLIC BLOOD PRESSURE: 169 MMHG | BODY MASS INDEX: 38.01 KG/M2 | RESPIRATION RATE: 19 BRPM | TEMPERATURE: 98.8 F

## 2024-09-27 DIAGNOSIS — R00.2 PALPITATIONS: Primary | ICD-10-CM

## 2024-09-27 LAB
2HR DELTA HS TROPONIN: <-2 NG/L
ALBUMIN SERPL BCG-MCNC: 4.6 G/DL (ref 3.5–5)
ALP SERPL-CCNC: 91 U/L (ref 34–104)
ALT SERPL W P-5'-P-CCNC: 85 U/L (ref 7–52)
ANION GAP SERPL CALCULATED.3IONS-SCNC: 11 MMOL/L (ref 4–13)
AST SERPL W P-5'-P-CCNC: 68 U/L (ref 13–39)
BASOPHILS # BLD AUTO: 0.07 THOUSANDS/ΜL (ref 0–0.1)
BASOPHILS NFR BLD AUTO: 1 % (ref 0–1)
BILIRUB SERPL-MCNC: 0.37 MG/DL (ref 0.2–1)
BUN SERPL-MCNC: 14 MG/DL (ref 5–25)
CALCIUM SERPL-MCNC: 9.8 MG/DL (ref 8.4–10.2)
CARDIAC TROPONIN I PNL SERPL HS: 4 NG/L
CARDIAC TROPONIN I PNL SERPL HS: <2 NG/L
CHLORIDE SERPL-SCNC: 103 MMOL/L (ref 96–108)
CO2 SERPL-SCNC: 24 MMOL/L (ref 21–32)
CREAT SERPL-MCNC: 0.68 MG/DL (ref 0.6–1.3)
EOSINOPHIL # BLD AUTO: 0.16 THOUSAND/ΜL (ref 0–0.61)
EOSINOPHIL NFR BLD AUTO: 2 % (ref 0–6)
ERYTHROCYTE [DISTWIDTH] IN BLOOD BY AUTOMATED COUNT: 12 % (ref 11.6–15.1)
GFR SERPL CREATININE-BSD FRML MDRD: 101 ML/MIN/1.73SQ M
GLUCOSE SERPL-MCNC: 91 MG/DL (ref 65–140)
HCT VFR BLD AUTO: 44.3 % (ref 34.8–46.1)
HGB BLD-MCNC: 14.6 G/DL (ref 11.5–15.4)
IMM GRANULOCYTES # BLD AUTO: 0.01 THOUSAND/UL (ref 0–0.2)
IMM GRANULOCYTES NFR BLD AUTO: 0 % (ref 0–2)
LYMPHOCYTES # BLD AUTO: 3.99 THOUSANDS/ΜL (ref 0.6–4.47)
LYMPHOCYTES NFR BLD AUTO: 43 % (ref 14–44)
MAGNESIUM SERPL-MCNC: 2.1 MG/DL (ref 1.9–2.7)
MCH RBC QN AUTO: 30.2 PG (ref 26.8–34.3)
MCHC RBC AUTO-ENTMCNC: 33 G/DL (ref 31.4–37.4)
MCV RBC AUTO: 92 FL (ref 82–98)
MONOCYTES # BLD AUTO: 0.83 THOUSAND/ΜL (ref 0.17–1.22)
MONOCYTES NFR BLD AUTO: 9 % (ref 4–12)
NEUTROPHILS # BLD AUTO: 4.33 THOUSANDS/ΜL (ref 1.85–7.62)
NEUTS SEG NFR BLD AUTO: 45 % (ref 43–75)
NRBC BLD AUTO-RTO: 0 /100 WBCS
PLATELET # BLD AUTO: 223 THOUSANDS/UL (ref 149–390)
PMV BLD AUTO: 12.8 FL (ref 8.9–12.7)
POTASSIUM SERPL-SCNC: 4.6 MMOL/L (ref 3.5–5.3)
PROT SERPL-MCNC: 8.3 G/DL (ref 6.4–8.4)
RBC # BLD AUTO: 4.83 MILLION/UL (ref 3.81–5.12)
SODIUM SERPL-SCNC: 138 MMOL/L (ref 135–147)
WBC # BLD AUTO: 9.39 THOUSAND/UL (ref 4.31–10.16)

## 2024-09-27 PROCEDURE — 80053 COMPREHEN METABOLIC PANEL: CPT | Performed by: EMERGENCY MEDICINE

## 2024-09-27 PROCEDURE — 93005 ELECTROCARDIOGRAM TRACING: CPT

## 2024-09-27 PROCEDURE — 36415 COLL VENOUS BLD VENIPUNCTURE: CPT | Performed by: EMERGENCY MEDICINE

## 2024-09-27 PROCEDURE — 99284 EMERGENCY DEPT VISIT MOD MDM: CPT | Performed by: EMERGENCY MEDICINE

## 2024-09-27 PROCEDURE — 71045 X-RAY EXAM CHEST 1 VIEW: CPT

## 2024-09-27 PROCEDURE — 99285 EMERGENCY DEPT VISIT HI MDM: CPT

## 2024-09-27 PROCEDURE — 85025 COMPLETE CBC W/AUTO DIFF WBC: CPT | Performed by: EMERGENCY MEDICINE

## 2024-09-27 PROCEDURE — 83735 ASSAY OF MAGNESIUM: CPT | Performed by: EMERGENCY MEDICINE

## 2024-09-27 PROCEDURE — 84484 ASSAY OF TROPONIN QUANT: CPT | Performed by: EMERGENCY MEDICINE

## 2024-09-27 NOTE — ED PROVIDER NOTES
Final diagnoses:   Palpitations     ED Disposition       ED Disposition   Discharge    Condition   Stable    Date/Time   Fri Sep 27, 2024 10:35 PM    Comment   Anh Hart discharge to home/self care.                   Assessment & Plan       Medical Decision Making  Patient is a 49-year-old female who presents to the emergency department with a complaint of palpitations.  She states that has been intermittent for several months but gotten substantially worse over the last 3 days.  She notices symptoms most at rest, and denies any chest pain, shortness of breath, or difficulty with exertion.  She has no prior history of cardiac disease.  She denies any symptoms associated with the palpitations such as near syncope. Comfortable and asymptomatic at this time. No exertional complaints.   No hx of heart disease or known arrhythmia.   Duration of complaints and lack of associated symptoms would suggest against malignant arrhythmia.     Amount and/or Complexity of Data Reviewed  Labs: ordered. Decision-making details documented in ED Course.  Radiology: ordered.  ECG/medicine tests: independent interpretation performed.     Details: NSR @ 72 bpm, normal axis, normal intervals, nonspecific st flattening, abnormal, no acute ischemia    Risk  Decision regarding hospitalization.        ED Course as of 09/27/24 2242   Fri Sep 27, 2024   2152 HS Troponin 0hr (reflex protocol)   2153 CBC and differential(!)   2153 Magnesium   2153 Comprehensive metabolic panel(!)       Medications - No data to display    ED Risk Strat Scores   HEART Risk Score      Flowsheet Row Most Recent Value   Heart Score Risk Calculator    History 0 Filed at: 09/27/2024 2241   ECG 1 Filed at: 09/27/2024 2241   Age 1 Filed at: 09/27/2024 2241   Risk Factors 0 Filed at: 09/27/2024 2241   Troponin 0 Filed at: 09/27/2024 2241   HEART Score 2 Filed at: 09/27/2024 2241                                                   History of Present Illness  "      Chief Complaint   Patient presents with    Chest Pain     Pt presents with chest pain on going for \"a few days\" describes as palpitations mostly when laying down, worsening in severity and tightness today. Reports headaches, neck, and jaw pain radiating into L arm.       Past Medical History:   Diagnosis Date    Allergic     Asthma     Eating disorder     Obesity     RhD negative       Past Surgical History:   Procedure Laterality Date    MYOMECTOMY      cryo, laser and D&C      Family History   Problem Relation Age of Onset    Asthma Mother     Diabetes Maternal Uncle       Social History     Tobacco Use    Smoking status: Former     Current packs/day: 0.00     Types: Cigarettes     Quit date: 2010     Years since quittin.1     Passive exposure: Never    Smokeless tobacco: Never   Vaping Use    Vaping status: Never Used   Substance Use Topics    Alcohol use: Never    Drug use: Never      E-Cigarette/Vaping    E-Cigarette Use Never User       E-Cigarette/Vaping Substances    Nicotine No     THC No     CBD No     Flavoring No     Other No     Unknown No       I have reviewed and agree with the history as documented.     Patient is a 49-year-old female who presents with palpitations          Review of Systems   Cardiovascular:  Positive for palpitations. Negative for chest pain.   All other systems reviewed and are negative.          Objective       ED Triage Vitals [24]   Temperature Pulse Blood Pressure Respirations SpO2 Patient Position - Orthostatic VS   98.8 °F (37.1 °C) 86 (!) 180/99 18 98 % Sitting      Temp Source Heart Rate Source BP Location FiO2 (%) Pain Score    Tympanic Monitor Left arm -- --      Vitals      Date and Time Temp Pulse SpO2 Resp BP Pain Score FACES Pain Rating User   24 -- 65 99 % 19 169/88 -- -- ST   24 98.8 °F (37.1 °C) 86 98 % 18 180/99 -- -- LA            Physical Exam  Vitals and nursing note reviewed.   Constitutional:       General: She " is not in acute distress.     Appearance: Normal appearance.   HENT:      Head: Normocephalic and atraumatic.      Right Ear: External ear normal.      Left Ear: External ear normal.      Nose: Nose normal.   Cardiovascular:      Rate and Rhythm: Normal rate and regular rhythm.   Pulmonary:      Effort: Pulmonary effort is normal.      Breath sounds: Normal breath sounds.   Abdominal:      General: There is no distension.      Palpations: Abdomen is soft.      Tenderness: There is no abdominal tenderness.   Musculoskeletal:      Right lower leg: No edema.      Left lower leg: No edema.   Skin:     General: Skin is warm and dry.   Neurological:      General: No focal deficit present.      Mental Status: She is alert and oriented to person, place, and time. Mental status is at baseline.   Psychiatric:         Behavior: Behavior normal.         Results Reviewed       Procedure Component Value Units Date/Time    HS Troponin I 2hr [[id number]]  (Normal) Collected: 09/27/24 2138    Lab Status: Final result Specimen: Blood from Arm, Right Updated: 09/27/24 2206     hs TnI 2hr <2 ng/L      Delta 2hr hsTnI <-2 ng/L     Magnesium [[id number]]  (Normal) Collected: 09/27/24 1948    Lab Status: Final result Specimen: Blood from Arm, Right Updated: 09/27/24 2102     Magnesium 2.1 mg/dL     Comprehensive metabolic panel [[id number]]  (Abnormal) Collected: 09/27/24 1948    Lab Status: Final result Specimen: Blood from Arm, Right Updated: 09/27/24 2038     Sodium 138 mmol/L      Potassium 4.6 mmol/L      Chloride 103 mmol/L      CO2 24 mmol/L      ANION GAP 11 mmol/L      BUN 14 mg/dL      Creatinine 0.68 mg/dL      Glucose 91 mg/dL      Calcium 9.8 mg/dL      AST 68 U/L      ALT 85 U/L      Alkaline Phosphatase 91 U/L      Total Protein 8.3 g/dL      Albumin 4.6 g/dL      Total Bilirubin 0.37 mg/dL      eGFR 101 ml/min/1.73sq m     Narrative:      National Kidney Disease Foundation guidelines for Chronic Kidney Disease (CKD):      Stage 1 with normal or high GFR (GFR > 90 mL/min/1.73 square meters)    Stage 2 Mild CKD (GFR = 60-89 mL/min/1.73 square meters)    Stage 3A Moderate CKD (GFR = 45-59 mL/min/1.73 square meters)    Stage 3B Moderate CKD (GFR = 30-44 mL/min/1.73 square meters)    Stage 4 Severe CKD (GFR = 15-29 mL/min/1.73 square meters)    Stage 5 End Stage CKD (GFR <15 mL/min/1.73 square meters)  Note: GFR calculation is accurate only with a steady state creatinine    HS Troponin I 4hr [[id number]]     Lab Status: No result Specimen: Blood     HS Troponin 0hr (reflex protocol) [[id number]]  (Normal) Collected: 09/27/24 1948    Lab Status: Final result Specimen: Blood from Arm, Right Updated: 09/27/24 2021     hs TnI 0hr 4 ng/L     CBC and differential [[id number]]  (Abnormal) Collected: 09/27/24 1948    Lab Status: Final result Specimen: Blood from Arm, Right Updated: 09/27/24 1955     WBC 9.39 Thousand/uL      RBC 4.83 Million/uL      Hemoglobin 14.6 g/dL      Hematocrit 44.3 %      MCV 92 fL      MCH 30.2 pg      MCHC 33.0 g/dL      RDW 12.0 %      MPV 12.8 fL      Platelets 223 Thousands/uL      nRBC 0 /100 WBCs      Segmented % 45 %      Immature Grans % 0 %      Lymphocytes % 43 %      Monocytes % 9 %      Eosinophils Relative 2 %      Basophils Relative 1 %      Absolute Neutrophils 4.33 Thousands/µL      Absolute Immature Grans 0.01 Thousand/uL      Absolute Lymphocytes 3.99 Thousands/µL      Absolute Monocytes 0.83 Thousand/µL      Eosinophils Absolute 0.16 Thousand/µL      Basophils Absolute 0.07 Thousands/µL             XR chest 1 view portable    (Results Pending)       Procedures    ED Medication and Procedure Management   Prior to Admission Medications   Prescriptions Last Dose Informant Patient Reported? Taking?   Blood Glucose Monitoring Suppl (OneTouch Verio Reflect) w/Device KIT   No No   Sig: Check blood sugars twice daily. Please substitute with appropriate alternative as covered by patient's insurance. Dx: E11.65    OneTouch Delica Lancets 33G MISC   No No   Sig: Check blood sugars twice daily. Please substitute with appropriate alternative as covered by patient's insurance. Dx: E11.65   albuterol (2.5 mg/3 mL) 0.083 % nebulizer solution   Yes No   Sig: Inhale 2.5 mg every 4 (four) hours as needed   albuterol (PROVENTIL HFA,VENTOLIN HFA) 90 mcg/act inhaler   Yes No   Sig: Inhale if needed   b complex vitamins capsule   Yes No   Sig: Take 1 capsule by mouth daily   cyclobenzaprine (FLEXERIL) 10 mg tablet   No No   Sig: Take 1 tablet (10 mg total) by mouth 2 (two) times a day as needed for muscle spasms   Patient not taking: Reported on 9/5/2024   ergocalciferol (VITAMIN D2) 50,000 units   No No   Sig: Take 1 capsule (50,000 Units total) by mouth once a week for 8 doses Once completed please start taking OTC D3 2000 units daily   glucose blood (OneTouch Verio) test strip   No No   Sig: Check blood sugars twice daily. Please substitute with appropriate alternative as covered by patient's insurance. Dx: E11.65   ibuprofen (MOTRIN) 200 mg tablet   Yes No   Sig: Take 200 mg by mouth if needed   Patient not taking: Reported on 9/5/2024   oxybutynin (DITROPAN) 5 mg tablet   No No   Sig: Take 1 tablet (5 mg total) by mouth daily at bedtime   triamcinolone (KENALOG) 0.5 % cream   Yes No   Sig: Apply 1 Application topically 3 (three) times a day      Facility-Administered Medications: None     Patient's Medications   Discharge Prescriptions    No medications on file     No discharge procedures on file.  ED SEPSIS DOCUMENTATION   Time reflects when diagnosis was documented in both MDM as applicable and the Disposition within this note       Time User Action Codes Description Comment    9/27/2024 10:35 PM Katya Cullen Add [R00.2] Palpitations                  Katya Cullen MD  09/27/24 5293

## 2024-09-28 LAB
ATRIAL RATE: 72 BPM
P AXIS: 44 DEGREES
PR INTERVAL: 162 MS
QRS AXIS: 17 DEGREES
QRSD INTERVAL: 84 MS
QT INTERVAL: 430 MS
QTC INTERVAL: 470 MS
T WAVE AXIS: 49 DEGREES
VENTRICULAR RATE: 72 BPM

## 2024-09-28 PROCEDURE — 93010 ELECTROCARDIOGRAM REPORT: CPT | Performed by: INTERNAL MEDICINE

## 2024-09-28 NOTE — DISCHARGE INSTRUCTIONS
You were seen and evaluated today for palpitations.  Your test results demonstrated normal cardiac enzymes, normal EKG  Please take all medications as instructed. Follow up with your PCP as discussed.   RETURN TO THE EMERGENCY DEPARTMENT if you develop new or worsening symptoms and are unable to see your PCP.

## 2024-09-30 DIAGNOSIS — E55.9 VITAMIN D DEFICIENCY: ICD-10-CM

## 2024-09-30 RX ORDER — ERGOCALCIFEROL 1.25 MG/1
50000 CAPSULE, LIQUID FILLED ORAL WEEKLY
Qty: 8 CAPSULE | Refills: 0 | Status: SHIPPED | OUTPATIENT
Start: 2024-09-30 | End: 2024-11-19

## 2024-11-13 ENCOUNTER — TELEMEDICINE (OUTPATIENT)
Dept: FAMILY MEDICINE CLINIC | Facility: CLINIC | Age: 49
End: 2024-11-13
Payer: COMMERCIAL

## 2024-11-13 VITALS
DIASTOLIC BLOOD PRESSURE: 96 MMHG | SYSTOLIC BLOOD PRESSURE: 141 MMHG | HEART RATE: 70 BPM | BODY MASS INDEX: 37.91 KG/M2 | WEIGHT: 206 LBS | HEIGHT: 62 IN

## 2024-11-13 DIAGNOSIS — B34.9 VIRAL SYNDROME: Primary | ICD-10-CM

## 2024-11-13 DIAGNOSIS — Z12.31 ENCOUNTER FOR SCREENING MAMMOGRAM FOR MALIGNANT NEOPLASM OF BREAST: ICD-10-CM

## 2024-11-13 PROCEDURE — 99213 OFFICE O/P EST LOW 20 MIN: CPT | Performed by: FAMILY MEDICINE

## 2024-11-13 RX ORDER — METHYLPREDNISOLONE 4 MG/1
TABLET ORAL
Qty: 21 EACH | Refills: 0 | Status: SHIPPED | OUTPATIENT
Start: 2024-11-13

## 2024-11-13 NOTE — PROGRESS NOTES
Virtual Regular Visit  Name: Anh Hart      : 1975      MRN: [id number]  Encounter Provider: Jojo Gu MD  Encounter Date: 2024   Encounter department: [facility]    Verification of patient location:    Patient is located at Home in the following state in which I hold an active license [place]    Assessment & Plan  Viral syndrome  Patient with a viral infection allergic to multiple antibiotics.  Sent in a Medrol pack to help with drainage of her ears as well as her dry cough.  If no improvement please notify me.  I likely will send in clindamycin.  Recommend testing for COVID if she can  Orders:    methylPREDNISolone 4 MG tablet therapy pack; Use as directed on package    Encounter for screening mammogram for malignant neoplasm of breast    Orders:    Mammo screening bilateral w 3d and cad; Future           Encounter provider Jojo Gu MD    The patient was identified by name and date of birth. Anh Hart was informed that this is a telemedicine visit and that the visit is being conducted through the [employer] platform. She agrees to proceed..  My office door was closed. No one else was in the room.  She acknowledged consent and understanding of privacy and security of the video platform. The patient has agreed to participate and understands they can discontinue the visit at any time.    Patient is aware this is a billable service.     History of Present Illness     HPI   Woke up sick, and feeling throat is hurts. Trouble of swallowing,head pressure, congestion, dry cough, chills.  Patient is allergic to multiple medications.  States that she is worried about taking any antibiotic.  She has been agreeable to steroids.  States that her  was sick last week and likely got what ever he had.  Has not tested for COVID yet      History obtained from : patient  Review of Systems   Constitutional:  Positive for chills and  fatigue. Negative for activity change, appetite change and fever.   HENT:  Positive for congestion, sinus pressure and sinus pain.    Respiratory:  Positive for cough. Negative for chest tightness and shortness of breath.    Cardiovascular:  Negative for chest pain and leg swelling.   Gastrointestinal:  Negative for abdominal distention, abdominal pain, constipation, diarrhea, nausea and vomiting.   All other systems reviewed and are negative.    Current Outpatient Medications on File Prior to Visit   Medication Sig Dispense Refill    albuterol (2.5 mg/3 mL) 0.083 % nebulizer solution Inhale 2.5 mg every 4 (four) hours as needed      albuterol (PROVENTIL HFA,VENTOLIN HFA) 90 mcg/act inhaler Inhale if needed      b complex vitamins capsule Take 1 capsule by mouth daily      Blood Glucose Monitoring Suppl (OneTouch Verio Reflect) w/Device KIT Check blood sugars twice daily. Please substitute with appropriate alternative as covered by patient's insurance. Dx: E11.65 1 kit 0    ergocalciferol (VITAMIN D2) 50,000 units Take 1 capsule (50,000 Units total) by mouth once a week for 8 doses Once completed please start taking OTC D3 2000 units daily 8 capsule 0    glucose blood (OneTouch Verio) test strip Check blood sugars twice daily. Please substitute with appropriate alternative as covered by patient's insurance. Dx: E11.65 200 each 3    OneTouch Delica Lancets 33G MISC Check blood sugars twice daily. Please substitute with appropriate alternative as covered by patient's insurance. Dx: E11.65 200 each 3    oxybutynin (DITROPAN) 5 mg tablet Take 1 tablet (5 mg total) by mouth daily at bedtime 30 tablet 0    cyclobenzaprine (FLEXERIL) 10 mg tablet Take 1 tablet (10 mg total) by mouth 2 (two) times a day as needed for muscle spasms (Patient not taking: Reported on 11/13/2024) 20 tablet 0    ibuprofen (MOTRIN) 200 mg tablet Take 200 mg by mouth if needed (Patient not taking: Reported on 11/13/2024)      triamcinolone  "(KENALOG) 0.5 % cream Apply 1 Application topically 3 (three) times a day (Patient not taking: Reported on 2024)       No current facility-administered medications on file prior to visit.      Social History     Tobacco Use    Smoking status: Former     Current packs/day: 0.00     Types: Cigarettes     Quit date: 2010     Years since quittin.2     Passive exposure: Never    Smokeless tobacco: Never   Vaping Use    Vaping status: Never Used   Substance and Sexual Activity    Alcohol use: Never    Drug use: Never    Sexual activity: Not on file         Objective     /96 (BP Location: Left arm, Patient Position: Sitting, Cuff Size: Standard)   Pulse 70   Ht 5' 2\" (1.575 m)   Wt 93.4 kg (206 lb)   LMP  (LMP Unknown)   BMI 37.68 kg/m²   Physical Exam  Vitals reviewed.   Constitutional:       Appearance: Normal appearance. She is well-developed.   HENT:      Head: Normocephalic and atraumatic.      Right Ear: Tympanic membrane, ear canal and external ear normal. There is no impacted cerumen.      Left Ear: Tympanic membrane, ear canal and external ear normal. There is no impacted cerumen.      Nose: Nose normal.      Mouth/Throat:      Mouth: Mucous membranes are moist.      Pharynx: Oropharynx is clear.   Eyes:      Conjunctiva/sclera: Conjunctivae normal.      Pupils: Pupils are equal, round, and reactive to light.   Cardiovascular:      Rate and Rhythm: Normal rate and regular rhythm.      Heart sounds: Normal heart sounds.   Pulmonary:      Effort: Pulmonary effort is normal.      Breath sounds: Normal breath sounds.   Abdominal:      General: Abdomen is flat. Bowel sounds are normal.      Palpations: Abdomen is soft.   Musculoskeletal:         General: Normal range of motion.      Cervical back: Normal range of motion and neck supple.   Skin:     General: Skin is warm.      Capillary Refill: Capillary refill takes less than 2 seconds.   Neurological:      General: No focal deficit present. "      Mental Status: She is alert and oriented to person, place, and time. Mental status is at baseline.   Psychiatric:         Mood and Affect: Mood normal.         Behavior: Behavior normal.         Thought Content: Thought content normal.         Judgment: Judgment normal.         Visit Time  Total Visit Duration: 15

## 2024-11-13 NOTE — LETTER
November 13, 2024     Patient: Anh Hart  YOB: 1975  Date of Visit: 11/13/2024      To Whom it May Concern:    Anh Hart is under my professional care. Anh was seen in my office on 11/13/2024. Anh may return to work on 11/15/2024 .    If you have any questions or concerns, please don't hesitate to call.         Sincerely,          Jojo Gu MD        CC: No Recipients

## 2024-11-15 ENCOUNTER — PATIENT MESSAGE (OUTPATIENT)
Dept: FAMILY MEDICINE CLINIC | Facility: CLINIC | Age: 49
End: 2024-11-15

## 2024-11-19 DIAGNOSIS — E55.9 VITAMIN D DEFICIENCY: ICD-10-CM

## 2024-11-20 RX ORDER — ERGOCALCIFEROL 1.25 MG/1
CAPSULE, LIQUID FILLED ORAL
Qty: 8 CAPSULE | Refills: 0 | Status: SHIPPED | OUTPATIENT
Start: 2024-11-20

## 2025-01-13 DIAGNOSIS — E55.9 VITAMIN D DEFICIENCY: ICD-10-CM

## 2025-01-14 RX ORDER — ERGOCALCIFEROL 1.25 MG/1
CAPSULE, LIQUID FILLED ORAL
Qty: 8 CAPSULE | Refills: 0 | Status: SHIPPED | OUTPATIENT
Start: 2025-01-14

## 2025-02-04 ENCOUNTER — TELEPHONE (OUTPATIENT)
Dept: FAMILY MEDICINE CLINIC | Facility: CLINIC | Age: 50
End: 2025-02-04

## 2025-02-25 DIAGNOSIS — E55.9 VITAMIN D DEFICIENCY: ICD-10-CM

## 2025-02-26 RX ORDER — ERGOCALCIFEROL 1.25 MG/1
CAPSULE, LIQUID FILLED ORAL
Qty: 8 CAPSULE | Refills: 0 | Status: SHIPPED | OUTPATIENT
Start: 2025-02-26

## 2025-03-07 ENCOUNTER — NURSE TRIAGE (OUTPATIENT)
Age: 50
End: 2025-03-07

## 2025-03-07 DIAGNOSIS — R73.09 ABNORMAL GLUCOSE: Primary | ICD-10-CM

## 2025-03-07 NOTE — TELEPHONE ENCOUNTER
"Reason for Disposition   Blood glucose 240 - 300 mg/dL (13.3 - 16.7 mmol/L)    Answer Assessment - Initial Assessment Questions  1. BLOOD GLUCOSE: \"What is your blood glucose level?\"       240  2. ONSET: \"When did you check the blood glucose?\"      Today  3. USUAL RANGE: \"What is your glucose level usually?\" (e.g., usual fasting morning value, usual evening value)      Unsure  4. KETONES: \"Do you check for ketones (urine or blood test strips)?\" If Yes, ask: \"What does the test show now?\"       unsure  5. TYPE 1 or 2:  \"Do you know what type of diabetes you have?\"  (e.g., Type 1, Type 2, Gestational; doesn't know)       prediabetes  6. INSULIN: \"Do you take insulin?\" \"What type of insulin(s) do you use? What is the mode of delivery? (syringe, pen; injection or pump)?\"       No  7. DIABETES PILLS: \"Do you take any pills for your diabetes?\" If Yes, ask: \"Have you missed taking any pills recently?\"      No  8. OTHER SYMPTOMS: \"Do you have any symptoms?\" (e.g., fever, frequent urination, difficulty breathing, dizziness, weakness, vomiting)      Increased urination, dizziness  9. PREGNANCY: \"Is there any chance you are pregnant?\" \"When was your last menstrual period?\"      no    Protocols used: Diabetes - High Blood Sugar-Adult-[place]  FOLLOW UP: No    REASON FOR CONVERSATION: Hyperglycemia    SYMPTOMS: blood sugar 240, increased urination, weakness    OTHER: Pt states that she ate a pancake this morning and now her sugar is 240. Pt is prediabetic.    DISPOSITION: Discuss With PCP and Callback by Nurse Within 1 Hour (overriding Home Care)    "

## 2025-03-07 NOTE — TELEPHONE ENCOUNTER
I spoke with patient and relayed provider's response and recommendations. Patient declined going to ER right now, states if she doesn't feel better when her  gets home she will go then. Patient said she will get labs done here and scheduled OV 3/10 at 11am. No further action needed.

## 2025-03-07 NOTE — TELEPHONE ENCOUNTER
I would recommend going for blood work today and seeing me on Monday in person.  However if she is symptomatic we have no appointments today and would have to be seen at the emergency room so that we can get blood work done and get results ASAP    Let me know, regardless need to see her next week

## 2025-04-15 DIAGNOSIS — E55.9 VITAMIN D DEFICIENCY: ICD-10-CM

## 2025-04-16 RX ORDER — ERGOCALCIFEROL 1.25 MG/1
CAPSULE, LIQUID FILLED ORAL
Qty: 8 CAPSULE | Refills: 0 | Status: SHIPPED | OUTPATIENT
Start: 2025-04-16

## 2025-04-22 LAB
25(OH)D3+25(OH)D2 SERPL-MCNC: 55 NG/ML (ref 30–100)
EST. AVERAGE GLUCOSE BLD GHB EST-MCNC: 128 MG/DL
HBA1C MFR BLD HPLC: 6.1 %
HBA1C MFR BLD: 6.1 %
INSULIN SERPL-ACNC: 28.3 UIU/ML (ref 3.2–16.3)

## 2025-04-30 ENCOUNTER — RESULTS FOLLOW-UP (OUTPATIENT)
Dept: FAMILY MEDICINE CLINIC | Facility: CLINIC | Age: 50
End: 2025-04-30

## 2025-05-01 NOTE — TELEPHONE ENCOUNTER
----- Message from Jojo Gu MD sent at 4/30/2025  3:38 PM EDT -----  Reviewed blood work results.  Patient does have prediabetes and her insulin fasting is elevated.  I do recommend that the patient be seen in our office for an evaluation and referral for endocrinology

## 2025-05-06 NOTE — TELEPHONE ENCOUNTER
Patient called. She is going to hold off on referral to Endocrinology . States she is working on her diet and has lost some weight. Will continue to do her regular appointments at this time

## 2025-06-09 ENCOUNTER — TELEMEDICINE (OUTPATIENT)
Dept: FAMILY MEDICINE CLINIC | Facility: CLINIC | Age: 50
End: 2025-06-09
Payer: COMMERCIAL

## 2025-06-09 DIAGNOSIS — E55.9 VITAMIN D DEFICIENCY: ICD-10-CM

## 2025-06-09 DIAGNOSIS — J04.0 LARYNGITIS: Primary | ICD-10-CM

## 2025-06-09 PROCEDURE — 99213 OFFICE O/P EST LOW 20 MIN: CPT | Performed by: FAMILY MEDICINE

## 2025-06-09 RX ORDER — ERGOCALCIFEROL 1.25 MG/1
CAPSULE, LIQUID FILLED ORAL
Qty: 8 CAPSULE | Refills: 0 | Status: SHIPPED | OUTPATIENT
Start: 2025-06-09

## 2025-06-09 NOTE — LETTER
June 9, 2025     Patient: Anh Hart  YOB: 1975  Date of Visit: 6/9/2025      To Whom it May Concern:    Anh Hart is under my professional care. Anh was seen in my office on 6/9/2025. Anh may return to work on 6/11/25.    If you have any questions or concerns, please don't hesitate to call.         Sincerely,          Jojo Gu MD        CC: No Recipients

## 2025-06-10 ENCOUNTER — TELEPHONE (OUTPATIENT)
Age: 50
End: 2025-06-10

## 2025-06-10 NOTE — TELEPHONE ENCOUNTER
Pt called to request to have Dr Bess extend her return to work date to Thursday 6/12/25.  She is still very hoarse and doesn't know if she will sound good enough to return to work on Wednesday.  If OK to extend date, please place the new note in her MyChart.  Thank you!

## 2025-06-25 ENCOUNTER — PATIENT MESSAGE (OUTPATIENT)
Dept: FAMILY MEDICINE CLINIC | Facility: CLINIC | Age: 50
End: 2025-06-25

## 2025-06-25 DIAGNOSIS — R73.09 ABNORMAL GLUCOSE: ICD-10-CM

## 2025-06-25 DIAGNOSIS — E55.9 VITAMIN D DEFICIENCY: Primary | ICD-10-CM

## 2025-06-25 DIAGNOSIS — R23.2 HOT FLASHES: ICD-10-CM

## 2025-06-26 ENCOUNTER — TELEMEDICINE (OUTPATIENT)
Dept: FAMILY MEDICINE CLINIC | Facility: CLINIC | Age: 50
End: 2025-06-26
Payer: COMMERCIAL

## 2025-06-26 ENCOUNTER — TELEPHONE (OUTPATIENT)
Dept: OTHER | Facility: OTHER | Age: 50
End: 2025-06-26

## 2025-06-26 DIAGNOSIS — Z77.018 EXPOSURE TO HAZARDOUS METALS: Primary | ICD-10-CM

## 2025-06-26 DIAGNOSIS — E16.2 HYPOGLYCEMIA: ICD-10-CM

## 2025-06-26 DIAGNOSIS — R53.82 CHRONIC FATIGUE: ICD-10-CM

## 2025-06-26 DIAGNOSIS — H54.7 VISION LOSS: Primary | ICD-10-CM

## 2025-06-26 PROCEDURE — 98005 SYNCH AUDIO-VIDEO EST LOW 20: CPT | Performed by: FAMILY MEDICINE

## 2025-06-26 RX ORDER — ACYCLOVIR 800 MG/1
1 TABLET ORAL
Qty: 2 EACH | Refills: 0 | Status: SHIPPED | OUTPATIENT
Start: 2025-06-26 | End: 2025-07-06 | Stop reason: SDUPTHER

## 2025-06-26 NOTE — PROGRESS NOTES
Virtual Regular Visit  Name: Anh Hart      : 1975      MRN: [id number]  Encounter Provider: Jojo Gu MD  Encounter Date: 2025   Encounter department: [facility]  :  Assessment & Plan  Vision loss  Patient will require to be seen by an endocrinologist.  Screening labs placed.  Did advise the patient that I am approving her FMLA request given that she has been ill.  And needs more help with different specialist       Hypoglycemia    Orders:  •  Continuous Glucose Sensor (FreeStyle Robi 3 Sensor) MISC; Use 1 each every 14 (fourteen) days  •  Ambulatory Referral to Endocrinology; Future    Chronic fatigue    Orders:  •  TSH, 3rd generation; Future  •  T4, free; Future    Vision loss         Hypoglycemia           Assessment & Plan          History of Present Illness     History of Present Illness  49-year-old female tearful today.  States that she has been battling syncopal events since the first day of work.  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  But unfortunate the patient does have unexplained syncopal events but has not been seen by the specialist.  She does believe it secondary to hypoglycemia    Review of Systems   Constitutional:  Positive for fatigue. Negative for activity change, appetite change, chills and fever.   HENT:  Negative for congestion.    Eyes:  Positive for visual disturbance.   Respiratory:  Negative for cough, chest tightness and shortness of breath.    Cardiovascular:  Negative for chest pain and leg swelling.   Gastrointestinal:  Negative for abdominal distention, abdominal pain, constipation, diarrhea, nausea and vomiting.   Neurological:  Positive for syncope.   All other systems reviewed and are negative.      Objective   LMP  (LMP Unknown)     Physical Exam  Constitutional:       Appearance: She is well-developed.   HENT:      Head: Normocephalic and  atraumatic.   Pulmonary:      Effort: Pulmonary effort is normal.     Musculoskeletal:         General: Normal range of motion.     Neurological:      Mental Status: She is alert and oriented to person, place, and time.     Psychiatric:         Behavior: Behavior normal.         Thought Content: Thought content normal.         Judgment: Judgment normal.      Comments: Behavior is normal but is tearful         Administrative Statements   Encounter provider Jojo Gu MD    The Patient is located at Home and in the following state in which I hold an active license [place].    The patient was identified by name and date of birth. Anh Pope Tanner was informed that this is a telemedicine visit and that the visit is being conducted through the Epic Embedded platform. She agrees to proceed..  My office door was closed. No one else was in the room.  She acknowledged consent and understanding of privacy and security of the video platform. The patient has agreed to participate and understands they can discontinue the visit at any time.    I have spent a total time of 15 minutes in caring for this patient on the day of the visit/encounter including Diagnostic results and Prognosis, not including the time spent for establishing the audio/video connection.

## 2025-06-26 NOTE — TELEPHONE ENCOUNTER
Pt reached the answering service, she was on her mychart for her virtual visit at 6:30 but it did not connect with the provider and then she seen a missed call. Sent a msg to the clerical staff and Dr. Maria Alejandra Gu. Sonja newman/the office reached back out and stated she would advise the provider.

## 2025-07-01 ENCOUNTER — TELEPHONE (OUTPATIENT)
Age: 50
End: 2025-07-01

## 2025-07-01 NOTE — TELEPHONE ENCOUNTER
Patient calling to follow up on [employer] message. Patient is not sure whether it is the NPI or tax ID or if practice is even able to participate? She states that if she put in the number it will allow her to directly upload her glucose readings to provider.

## 2025-07-04 ENCOUNTER — PATIENT MESSAGE (OUTPATIENT)
Dept: FAMILY MEDICINE CLINIC | Facility: CLINIC | Age: 50
End: 2025-07-04

## 2025-07-06 DIAGNOSIS — E16.2 HYPOGLYCEMIA: ICD-10-CM

## 2025-07-08 RX ORDER — ACYCLOVIR 800 MG/1
1 TABLET ORAL
Qty: 2 EACH | Refills: 0 | Status: SHIPPED | OUTPATIENT
Start: 2025-07-08

## 2025-07-09 NOTE — PATIENT COMMUNICATION
Patient called again, she stated she was on the phone with office and call got disconnected.  Attempted to relay message, patient stated she is able to make appointment at 10:15, she wanted me to send this message to staff and she would know what she is talking about.

## 2025-07-09 NOTE — PATIENT COMMUNICATION
I attempted to tell patient to contact neida in regards to appointment.  She stated she was talking with Primary Care Provider office and that staff there called neida to get her an appointment.  She wanted staff member she just spoke with on the phone from [facility] to know she can make the 10 o'clock appointment with neida.  I understand the appointment is with neida however patient was adamant that she received a call from [facility].

## 2025-07-15 ENCOUNTER — OFFICE VISIT (OUTPATIENT)
Dept: ENDOCRINOLOGY | Facility: HOSPITAL | Age: 50
End: 2025-07-15
Attending: FAMILY MEDICINE
Payer: COMMERCIAL

## 2025-07-15 VITALS
DIASTOLIC BLOOD PRESSURE: 80 MMHG | HEART RATE: 64 BPM | BODY MASS INDEX: 37.87 KG/M2 | WEIGHT: 205.8 LBS | SYSTOLIC BLOOD PRESSURE: 122 MMHG | HEIGHT: 62 IN

## 2025-07-15 DIAGNOSIS — R23.2 HOT FLASHES: ICD-10-CM

## 2025-07-15 DIAGNOSIS — E16.2 HYPOGLYCEMIA: Primary | ICD-10-CM

## 2025-07-15 DIAGNOSIS — E55.9 VITAMIN D DEFICIENCY: ICD-10-CM

## 2025-07-15 DIAGNOSIS — R73.09 ABNORMAL GLUCOSE: ICD-10-CM

## 2025-07-15 PROCEDURE — 95251 CONT GLUC MNTR ANALYSIS I&R: CPT | Performed by: INTERNAL MEDICINE

## 2025-07-15 PROCEDURE — 99204 OFFICE O/P NEW MOD 45 MIN: CPT | Performed by: INTERNAL MEDICINE

## 2025-07-15 NOTE — ASSESSMENT & PLAN NOTE
Orders:    Ambulatory Referral to Endocrinology    Comprehensive metabolic panel    T4, free    TSH, 3rd generation    Catecholamines, fractionated, plasma    Metanephrine, Fractionated Plasma Free    C-peptide    Insulin, random    Cortisol; Future

## 2025-07-15 NOTE — ASSESSMENT & PLAN NOTE
Orders:    Comprehensive metabolic panel    T4, free    TSH, 3rd generation    Catecholamines, fractionated, plasma    Metanephrine, Fractionated Plasma Free    C-peptide    Insulin, random    Cortisol; Future

## 2025-07-15 NOTE — PROGRESS NOTES
Name: Anh Hart      : 1975      MRN: [id number]  Encounter Provider: Cara Turpin MD  Encounter Date: 7/15/2025   Encounter department: [facility]    No chief complaint on file.  :  Assessment & Plan  Vitamin D deficiency    Orders:    Ambulatory Referral to Endocrinology    Abnormal glucose    Orders:    Ambulatory Referral to Endocrinology    Comprehensive metabolic panel    T4, free    TSH, 3rd generation    Catecholamines, fractionated, plasma    Metanephrine, Fractionated Plasma Free    C-peptide    Insulin, random    Cortisol; Future    Hot flashes    Orders:    Ambulatory Referral to Endocrinology    Comprehensive metabolic panel    T4, free    TSH, 3rd generation    Catecholamines, fractionated, plasma    Metanephrine, Fractionated Plasma Free    C-peptide    Insulin, random    Cortisol; Future    Hypoglycemia    Orders:    Comprehensive metabolic panel    T4, free    TSH, 3rd generation    Catecholamines, fractionated, plasma    Metanephrine, Fractionated Plasma Free    C-peptide    Insulin, random    Cortisol; Future      Assessment & Plan  1.  Hypoglycemia.  - She reports episodes of sweating, shakiness, confusion, numbness, and tingling, particularly when her blood sugar drops to as low as 55 mg/dL. These episodes occur both during the day and at night.  - She has been advised to eat small amounts every 2-3 hours and include more protein in her diet while reducing carbohydrates and sweets. Fasting blood work will be conducted between 7:00 AM and 9:00 AM to assess insulin levels with C-peptide, adrenaline hormones, thyroid function, cortisol levels, and comprehensive metabolic panel (CMP). She should confirm low blood sugar readings from her Robi device with a fingerstick before eating to raise her blood sugar levels. If the blood work indicates high insulin levels, a 3-day hospital admission for fasting may be necessary (72-hour  fast).    2. . Vitamin D deficiency: .  - She had a previous vitamin D level of 12.6 ng/mL, which has since improved with supplementation.    3.  Hot flashes.  Secondary causes of hot flashes will be ruled out such as elevated metanephrines and catecholamines.  Insulin level and cortisol will also be evaluated.    Follow-up: To be determined based on blood work results.    She will get blood work performed at her earliest convenience consisting of a CMP with fasting insulin level and C-peptide level, TSH, free T4, a.m. cortisol, fractionated plasma catecholamines and metanephrines.      Pertinent Medical History   Anh Hart is a 49-year-old female with history of hypoglycemia and prediabetes along with hot flashes and vitamin D deficiency.    She has been experiencing symptoms such as sweating, shakiness, confusion, and difficulty focusing, which she initially attributed to menopause. However, these symptoms have escalated to the point where they interfere with her work. During one episode, she was unable to process information from a phone call and had to leave work. On that day, her blood sugar level was recorded at 73.      History of Present Illness   History of Present Illness  Anh Hart is a 49-year-old female here for evaluation/consultation regarding prediabetes and hypoglycemia.    She has been experiencing symptoms such as sweating, shakiness, confusion, and difficulty focusing, which she initially attributed to menopause. However, these symptoms have escalated to the point where they interfere with her work. During one episode, she was unable to process information from a phone call and had to leave work. On that day, her blood sugar level was recorded at 73. She also experiences intermittent numbness and tingling in her legs and fingers, which she has noticed occurs when her blood sugar drops. Her blood sugar levels have been as low as 55, particularly during sleep, waking her up  with symptoms of sweating and intense hunger. She reports feeling weak, tired, and hungry during these episodes, which occur both during the day and at night. Eating sometimes alleviates her symptoms, but not always. She has been advised to eat every 1 to 2 hours, which seems to help. She has noticed that consuming carbohydrates or sweets leads to a rapid drop in her blood sugar levels. These episodes occur almost daily. Over the past 7 days, she has been eating every couple of hours and has not noticed a significant drop in her blood sugar levels.     She does not consume meat, rarely eats eggs, and avoids dairy products due to inflammation. She consumes pumpkin seeds, nuts, tofu, and beans for protein. She is unsure if she flushes or turns pale during these episodes. Her weight fluctuates between 206 and 212 pounds. She experiences constipation and stomach pain. She spends most of her day sitting at a computer and does not engage in much physical activity. Prolonged standing causes leg pain. She wakes up to eat and often feels tired and lacks energy. She experiences constant numbness in her hands, occasional headaches, hair loss, dry skin, and blurry vision, which is worse than usual during these episodes. She avoids driving at night due to her blurry vision. She urinates frequently, including at night, but does not feel excessively thirsty. She only feels hungry when her blood sugar is low.     FAMILY HISTORY  Her mother  of diabetes and had asthma and COPD. Her maternal uncle had diabetes. Her father had cirrhosis.    CGM Interpretation:  Date Range: 2025 through 7/15/2025  Device used: Freestyle yadira 3+  Type: Hypoglycemia  Home use, CGM active 68% of the time  Analysis of data:   Average Glucose: 102 mg/dL  GMI: 5.7%  Coefficient of Variation: 19.8%  Glucose Variability: 19.8%  Time in Target Range: 95%   Time Above Range: 0% high, 0% very high  Time Below Range: 4% low, 1% very low  Interpretation of  "data: Overall freestyle yadira download does show some low blood sugars overnight and intermittently throughout the day which over the last 4 days seems to have improved significantly.  More than 72 hours of data was reviewed. Report to be scanned to chart.     Review of Systems as per [facility]  Medical History Reviewed by provider this encounter:  Tobacco  Allergies  Meds  Problems  Med Hx  Surg Hx  Fam Hx     .  Medications Ordered Prior to Encounter[1]   Social History[2]     Medical History Reviewed by provider this encounter:  Tobacco  Allergies  Meds  Problems  Med Hx  Surg Hx  Fam Hx     .    Objective   /80   Pulse 64   Ht 5' 2\" (1.575 m)   Wt 93.4 kg (205 lb 12.8 oz)   LMP  (LMP Unknown)   BMI 37.64 kg/m²      Body mass index is 37.64 kg/m².  Wt Readings from Last 3 Encounters:   07/15/25 93.4 kg (205 lb 12.8 oz)   11/13/24 93.4 kg (206 lb)   09/27/24 93.7 kg (206 lb 9.1 oz)     Physical Exam  Physical Exam  Head and Neck: No lid lag, stare, proptosis or periorbital edema. No moon facies. No hirsutism on the chin. Thyroid is normal in size. No palpable nodules. Mild buffalo hump present, but no supraclavicular fat pad. Acanthosis nigricans in the nape of the neck.  Cardiovascular: Heart has a regular rate and rhythm. No murmurs.  Respiratory: Lungs are clear to auscultation.  Musculoskeletal: No tremor of the outstretched hands.  Neurological: Patellar deep tendon reflexes are normal.  Extremities: No lower extremity edema.    Results    Labs: I have reviewed pertinent labs including:   Lab Results   Component Value Date    HGBA1C 6.1 (H) 04/22/2025    HGBA1C 6.1 04/22/2025    HGBA1C 6.2 (H) 04/10/2024      Blood work performed on 4/22/2025 showed a 25-hydroxy vitamin D level of 55.    A random insulin level was 28.3 but no corresponding glucose was performed at the same time.    Lab Results   Component Value Date    CREATININE 0.68 09/27/2024    CREATININE 0.74 04/10/2024    CREATININE " 0.75 12/08/2022    BUN 14 09/27/2024    K 4.6 09/27/2024     09/27/2024    CO2 24 09/27/2024      EGFR   Date Value Ref Range Status   12/08/2022 82.13  Final     Comment:     Chronic Kidney Disease: eGFR <60 mL/min/1.73 sq.m.  Renal Failure: eGFR <15 mL/min/1.73 sq.m.     eGFR   Date Value Ref Range Status   09/27/2024 101 ml/min/1.73sq m Final      HDL, Direct   Date Value Ref Range Status   04/10/2024 54 >=50 mg/dL Final     Triglycerides   Date Value Ref Range Status   04/10/2024 97 See Comment mg/dL Final     Comment:     Triglyceride:     0-9Y            <75mg/dL     10Y-17Y         <90 mg/dL       >=18Y     Normal          <150 mg/dL     Borderline High 150-199 mg/dL     High            200-499 mg/dL        Very High       >499 mg/dL    Specimen collection should occur prior to Metamizole administration due to the potential for falsely depressed results.      ALT   Date Value Ref Range Status   09/27/2024 85 (H) 7 - 52 U/L Final     Comment:     Specimen collection should occur prior to Sulfasalazine administration due to the potential for falsely depressed results.    12/08/2022 39 7 - 52 IU/L Final     AST   Date Value Ref Range Status   09/27/2024 68 (H) 13 - 39 U/L Final     Comment:     Moderately Hemolyzed:Results may be affected.   12/08/2022 33 13 - 39 IU/L Final     Alkaline Phosphatase   Date Value Ref Range Status   09/27/2024 91 34 - 104 U/L Final   12/08/2022 92 34 - 104 IU/L Final         Lab Results   Component Value Date    KBMF84IGYVSV 12.7 (L) 08/24/2024    [id number] 22.3 06/21/2019      Radiology Results Review : No pertinent imaging studies reviewed.  Patient Instructions   Continue to eat every 2-3 hours or more if needed to keep sugars from going low. Eat small amounts and more protein and less starch/sweets.    Continue to use the yadira, if low sugars ,confirm with fingerstick before eating to bring sugar up.     We'll do fasting blood work between 7-9 am.     Follow up to be  determined.     If the blood work implies high insulin, we may need to arrange for a 3 day hospital admission for fasting.     Discussed with the patient and all questioned fully answered. She will call me if any problems arise.           [1]   Current Outpatient Medications on File Prior to Visit   Medication Sig Dispense Refill    albuterol (PROVENTIL HFA,VENTOLIN HFA) 90 mcg/act inhaler Inhale if needed      b complex vitamins capsule Take 1 capsule by mouth in the morning.      Blood Glucose Monitoring Suppl (OneTouch Verio Reflect) w/Device KIT Check blood sugars twice daily. Please substitute with appropriate alternative as covered by patient's insurance. Dx: E11.65 1 kit 0    Continuous Glucose Sensor (FreeStyle Robi 3 Sensor) MISC Use 1 each every 14 (fourteen) days 2 each 0    ergocalciferol (VITAMIN D2) 50,000 units take 1 capsule by mouth every week for 8 doses ONCE COMPLETED PLEASE START TAKING OTC D3 2000 UNITS daily 8 capsule 0    glucose blood (OneTouch Verio) test strip Check blood sugars twice daily. Please substitute with appropriate alternative as covered by patient's insurance. Dx: E11.65 200 each 3    OneTouch Delica Lancets 33G MISC Check blood sugars twice daily. Please substitute with appropriate alternative as covered by patient's insurance. Dx: E11.65 200 each 3    oxybutynin (DITROPAN) 5 mg tablet Take 1 tablet (5 mg total) by mouth daily at bedtime (Patient not taking: Reported on 7/15/2025) 30 tablet 0     No current facility-administered medications on file prior to visit.   [2]   Social History  Tobacco Use    Smoking status: Former     Current packs/day: 0.00     Types: Cigarettes     Quit date: 2010     Years since quittin.9     Passive exposure: Never    Smokeless tobacco: Never   Vaping Use    Vaping status: Never Used   Substance and Sexual Activity    Alcohol use: Never    Drug use: Never

## 2025-07-19 LAB
ALBUMIN SERPL-MCNC: 4 G/DL (ref 3.5–5.7)
ALP SERPL-CCNC: 96 U/L (ref 35–120)
ALT SERPL-CCNC: 44 U/L
ANION GAP SERPL CALCULATED.3IONS-SCNC: 11 MMOL/L (ref 3–11)
AST SERPL-CCNC: 32 U/L
BILIRUB SERPL-MCNC: 0.5 MG/DL (ref 0.2–1)
BUN SERPL-MCNC: 9 MG/DL (ref 7–25)
C PEPTIDE SERPL-MCNC: 5.3 NG/ML (ref 0.8–4.2)
CALCIUM SERPL-MCNC: 9.2 MG/DL (ref 8.5–10.5)
CATECHOLS PLAS-IMP: NORMAL
CATECHOLS PLAS-MCNC: NORMAL PG/ML
CHLORIDE SERPL-SCNC: 102 MMOL/L (ref 100–109)
CO2 SERPL-SCNC: 26 MMOL/L (ref 21–31)
CORTIS SERPL-MCNC: 11 UG/DL
CREAT SERPL-MCNC: 0.7 MG/DL (ref 0.4–1.1)
CYTOLOGY CMNT CVX/VAG CYTO-IMP: ABNORMAL
DOPAMINE 24H UR-MRATE: <130 PMOL/L
EPINEPH PLAS-MCNC: 153 PMOL/L
GFR/BSA.PRED SERPLBLD CYS-BASED-ARV: 104 ML/MIN/{1.73_M2}
GLUCOSE SERPL-MCNC: 102 MG/DL (ref 65–99)
INSULIN SERPL-ACNC: 33.1 UIU/ML (ref 3.2–16.3)
NOREPINEPH PLAS-MCNC: 1125 PMOL/L (ref 1050–4800)
POTASSIUM SERPL-SCNC: 4.4 MMOL/L (ref 3.5–5.2)
PROT SERPL-MCNC: 6.6 G/DL (ref 6.3–8.3)
SODIUM SERPL-SCNC: 139 MMOL/L (ref 135–145)
T4 FREE SERPL-MCNC: 0.82 NG/DL (ref 0.61–1.12)
TSH SERPL-ACNC: 2.8 UIU/ML (ref 0.45–5.33)

## 2025-07-24 ENCOUNTER — TELEPHONE (OUTPATIENT)
Age: 50
End: 2025-07-24

## 2025-07-29 ENCOUNTER — TELEPHONE (OUTPATIENT)
Dept: FAMILY MEDICINE CLINIC | Facility: CLINIC | Age: 50
End: 2025-07-29

## 2025-08-01 ENCOUNTER — TELEPHONE (OUTPATIENT)
Dept: ENDOCRINOLOGY | Facility: HOSPITAL | Age: 50
End: 2025-08-01

## 2025-08-01 DIAGNOSIS — E16.2 HYPOGLYCEMIA: Primary | ICD-10-CM

## 2025-08-05 ENCOUNTER — PATIENT MESSAGE (OUTPATIENT)
Dept: ENDOCRINOLOGY | Facility: CLINIC | Age: 50
End: 2025-08-05

## 2025-08-18 ENCOUNTER — DOCUMENTATION (OUTPATIENT)
Dept: ENDOCRINOLOGY | Facility: HOSPITAL | Age: 50
End: 2025-08-18

## 2025-08-19 PROBLEM — J45.20 MILD INTERMITTENT ASTHMA WITHOUT COMPLICATION: Status: ACTIVE | Noted: 2024-04-10
